# Patient Record
Sex: FEMALE | Race: WHITE | NOT HISPANIC OR LATINO | Employment: FULL TIME | RURAL
[De-identification: names, ages, dates, MRNs, and addresses within clinical notes are randomized per-mention and may not be internally consistent; named-entity substitution may affect disease eponyms.]

---

## 2021-07-28 ENCOUNTER — OFFICE VISIT (OUTPATIENT)
Dept: FAMILY MEDICINE CLINIC | Facility: CLINIC | Age: 41
End: 2021-07-28

## 2021-07-28 VITALS
OXYGEN SATURATION: 97 % | SYSTOLIC BLOOD PRESSURE: 124 MMHG | BODY MASS INDEX: 40.95 KG/M2 | DIASTOLIC BLOOD PRESSURE: 66 MMHG | WEIGHT: 245.8 LBS | HEART RATE: 85 BPM | HEIGHT: 65 IN | TEMPERATURE: 98.4 F | RESPIRATION RATE: 16 BRPM

## 2021-07-28 DIAGNOSIS — Z13.1 SCREENING FOR DIABETES MELLITUS: ICD-10-CM

## 2021-07-28 DIAGNOSIS — J30.89 NON-SEASONAL ALLERGIC RHINITIS, UNSPECIFIED TRIGGER: Primary | ICD-10-CM

## 2021-07-28 DIAGNOSIS — K21.9 GASTROESOPHAGEAL REFLUX DISEASE, UNSPECIFIED WHETHER ESOPHAGITIS PRESENT: ICD-10-CM

## 2021-07-28 DIAGNOSIS — E66.01 CLASS 3 SEVERE OBESITY DUE TO EXCESS CALORIES WITHOUT SERIOUS COMORBIDITY WITH BODY MASS INDEX (BMI) OF 40.0 TO 44.9 IN ADULT (HCC): ICD-10-CM

## 2021-07-28 DIAGNOSIS — F41.1 GENERALIZED ANXIETY DISORDER: ICD-10-CM

## 2021-07-28 DIAGNOSIS — K52.9 CHRONIC DIARRHEA: ICD-10-CM

## 2021-07-28 DIAGNOSIS — J30.89 NON-SEASONAL ALLERGIC RHINITIS, UNSPECIFIED TRIGGER: ICD-10-CM

## 2021-07-28 DIAGNOSIS — R53.82 CHRONIC FATIGUE: ICD-10-CM

## 2021-07-28 PROBLEM — R00.1 BRADYCARDIA: Status: ACTIVE | Noted: 2021-07-28

## 2021-07-28 PROBLEM — F41.9 ANXIETY: Status: ACTIVE | Noted: 2021-07-28

## 2021-07-28 PROBLEM — I48.0 PAROXYSMAL ATRIAL FIBRILLATION: Status: ACTIVE | Noted: 2021-07-28

## 2021-07-28 PROBLEM — E66.813 CLASS 3 SEVERE OBESITY DUE TO EXCESS CALORIES WITHOUT SERIOUS COMORBIDITY WITH BODY MASS INDEX (BMI) OF 40.0 TO 44.9 IN ADULT: Status: ACTIVE | Noted: 2021-07-28

## 2021-07-28 PROBLEM — I10 HYPERTENSION, BENIGN: Status: ACTIVE | Noted: 2021-07-28

## 2021-07-28 PROCEDURE — 99204 OFFICE O/P NEW MOD 45 MIN: CPT | Performed by: FAMILY MEDICINE

## 2021-07-28 RX ORDER — FEXOFENADINE HYDROCHLORIDE 60 MG/1
TABLET, FILM COATED ORAL
COMMUNITY
Start: 2020-09-14

## 2021-07-28 RX ORDER — BUSPIRONE HYDROCHLORIDE 7.5 MG/1
7.5 TABLET ORAL 2 TIMES DAILY
Qty: 60 TABLET | Refills: 5 | Status: SHIPPED | OUTPATIENT
Start: 2021-07-28 | End: 2021-08-18 | Stop reason: SDUPTHER

## 2021-07-28 RX ORDER — MONTELUKAST SODIUM 10 MG/1
10 TABLET ORAL NIGHTLY
Qty: 30 TABLET | Refills: 5 | Status: SHIPPED | OUTPATIENT
Start: 2021-07-28 | End: 2021-07-28

## 2021-07-28 RX ORDER — MONTELUKAST SODIUM 10 MG/1
10 TABLET ORAL NIGHTLY
Qty: 90 TABLET | Refills: 1 | Status: SHIPPED | OUTPATIENT
Start: 2021-07-28 | End: 2021-08-18 | Stop reason: SDUPTHER

## 2021-07-28 RX ORDER — LORATADINE 10 MG/1
CAPSULE, LIQUID FILLED ORAL
COMMUNITY
Start: 2021-02-15 | End: 2021-10-18

## 2021-07-28 NOTE — PROGRESS NOTES
Chief Complaint   Patient presents with   • Heartburn   • Anxiety   • Fatigue       Subjective   Didi Rivas is a 40 y.o. female.     Heartburn  She complains of belching and heartburn. She reports no chest pain or no choking. This is a recurrent problem. The current episode started more than 1 year ago (with birth of her son). The problem occurs frequently. The problem has been gradually worsening (worse over the past 6 months). Heartburn duration: varies. The heartburn is located in the substernum and left chest. Heartburn pain intensity: varies. The heartburn wakes her from sleep. The heartburn does not limit her activity. The heartburn changes with position. The symptoms are aggravated by bending, caffeine, certain foods and lying down (pizza, spaghetti, steak, eggs, even water - almost all foods). Associated symptoms include fatigue. Risk factors include caffeine use and lack of exercise. She has tried an antacid and a PPI for the symptoms. The treatment provided significant (no breakthrough symptoms on Nexium) relief.   Fatigue  This is a chronic problem. The current episode started more than 1 year ago. The problem occurs constantly. The problem has been gradually worsening. Associated symptoms include congestion and fatigue. Pertinent negatives include no chest pain, chills, fever, joint swelling, rash, swollen glands, vomiting or weakness. Nothing aggravates the symptoms. She has tried nothing for the symptoms.   Anxiety  Presents for initial visit. Episode onset: years. The problem has been gradually worsening. Symptoms include decreased concentration, depressed mood, excessive worry, insomnia, irritability, muscle tension, nervous/anxious behavior, obsessions, palpitations (palpitations in the past, better now), panic, restlessness and shortness of breath. Patient reports no chest pain, dizziness or suicidal ideas. Symptoms occur most days. Duration: varies. The severity of symptoms is causing  significant distress and interfering with daily activities (varies, ). The symptoms are aggravated by family issues, social activities, specific phobias and work stress. The patient sleeps 5 hours (to 6) per night. The quality of sleep is fair (to poor). Nighttime awakenings: occasional.     Risk factors include family history. Her past medical history is significant for anxiety/panic attacks. There is no history of suicide attempts. Treatments tried: lexapro. The treatment provided moderate relief. Compliance with prior treatments has been good.   Allergic Reaction  This is a chronic problem. The problem occurs constantly. The problem has been waxing and waning since onset. The problem is moderate. Associated with: seasonal allergens. Associated symptoms include diarrhea. Pertinent negatives include no chest pain, itching, rash, trouble swallowing or vomiting. There is no swelling present. Past treatments include one or more OTC medications (she alternates zyrtec and claritin). The treatment provided moderate relief. Her past medical history is significant for seasonal allergies.      Patient is returning to office with several concerns today.  Last visit in the office was 2016.  Interval medical care provided at the local urgent cares, mostly for sinus related symptoms and allergies.  She reports she had allergy testing in the past, but allergen reaction was not severe enough to warrant shots.  She was seen at [facility] for this.  She feels her allergies have worsened since last testing.      I have reviewed relevant past medical, family, social and surgical history for this patient.  Medications review is done by myself, with patient.      Past Medical History :  Active Ambulatory Problems     Diagnosis Date Noted   • Anxiety 07/28/2021   • Bradycardia 07/28/2021   • Hypertension, benign 07/28/2021   • Paroxysmal atrial fibrillation (CMS/HCC) 07/28/2021     Resolved Ambulatory Problems     Diagnosis Date Noted    • No Resolved Ambulatory Problems     Past Medical History:   Diagnosis Date   • Allergic    • Atrial fibrillation (CMS/HCC)    • GERD (gastroesophageal reflux disease) 2015       Medication List:    Current Outpatient Medications:   •  esomeprazole (nexIUM) 20 MG capsule, , Disp: , Rfl:   •  Etonogestrel (NEXPLANON) 68 MG implant subdermal implant, Inject  under the skin into the appropriate area as directed. Left arm, Disp: , Rfl:   •  fexofenadine (Allegra Allergy) 60 MG tablet, , Disp: , Rfl:   •  Loratadine (Claritin) 10 MG capsule, , Disp: , Rfl:       No Known Allergies    Social History     Tobacco Use   • Smoking status: Never Smoker   • Smokeless tobacco: Never Used   Substance Use Topics   • Alcohol use: Yes     Alcohol/week: 1.0 standard drinks     Types: 1 Glasses of wine per week     Comment: 1 drink weekly       PHQ-2 Depression Screening  Little interest or pleasure in doing things? 1   Feeling down, depressed, or hopeless? 0   PHQ-2 Total Score 1         Review of Systems   Constitutional: Positive for fatigue and irritability. Negative for chills and fever.   HENT: Positive for congestion and rhinorrhea. Negative for swollen glands and trouble swallowing.    Eyes: Negative for blurred vision, double vision and visual disturbance.   Respiratory: Positive for shortness of breath. Negative for apnea and choking.         + snoring   Cardiovascular: Positive for palpitations (palpitations in the past, better now). Negative for chest pain.   Gastrointestinal: Positive for constipation, diarrhea and GERD. Negative for vomiting.        Stools alternate between constipation and diarrhea   Endocrine: Negative for polydipsia and polyuria.   Genitourinary: Positive for amenorrhea (infrequent vaginal bleeding with Nexplanon). Negative for dysuria, menstrual problem and pelvic pain.   Musculoskeletal: Negative for joint swelling.   Skin: Negative for itching, pallor and rash.   Allergic/Immunologic: Positive  "for environmental allergies (h/o allergy testing, but did not require shots).   Neurological: Negative for dizziness, seizures, syncope and weakness.   Hematological: Negative for adenopathy.   Psychiatric/Behavioral: Positive for agitation, decreased concentration, sleep disturbance, depressed mood and stress. Negative for self-injury and suicidal ideas. The patient is nervous/anxious and has insomnia.         Anxiety           Objective   Vitals:    07/28/21 1021   BP: 124/66   BP Location: Right arm   Patient Position: Sitting   Cuff Size: Large Adult   Pulse: 85   Resp: 16   Temp: 98.4 °F (36.9 °C)   TempSrc: Skin   SpO2: 97%   Weight: 111 kg (245 lb 12.8 oz)   Height: 165.1 cm (65\")     Body mass index is 40.9 kg/m².    Physical Exam  Constitutional:       General: She is not in acute distress.     Appearance: Normal appearance. She is well-developed.   HENT:      Head: Normocephalic and atraumatic.      Right Ear: Tympanic membrane, ear canal and external ear normal.      Left Ear: Tympanic membrane, ear canal and external ear normal.   Eyes:      General: No scleral icterus.        Right eye: No discharge.         Left eye: No discharge.      Extraocular Movements: Extraocular movements intact.      Conjunctiva/sclera: Conjunctivae normal.   Neck:      Thyroid: No thyromegaly or thyroid tenderness.   Cardiovascular:      Rate and Rhythm: Normal rate and regular rhythm.      Heart sounds: Normal heart sounds. No murmur heard.     Pulmonary:      Effort: Pulmonary effort is normal.      Breath sounds: Normal breath sounds.   Abdominal:      Palpations: Abdomen is soft.   Musculoskeletal:         General: No swelling.      Cervical back: Normal range of motion and neck supple.      Right lower leg: No edema.      Left lower leg: No edema.   Skin:     General: Skin is warm.      Capillary Refill: Capillary refill takes less than 2 seconds.      Findings: No rash.   Neurological:      General: No focal deficit " present.      Mental Status: She is alert.      Cranial Nerves: No cranial nerve deficit.   Psychiatric:         Mood and Affect: Mood normal.         Behavior: Behavior normal.         Thought Content: Thought content normal.         Judgment: Judgment normal.           Assessment/Plan     Diagnoses and all orders for this visit:    1. Non-seasonal allergic rhinitis, unspecified trigger (Primary)  Comments:  Add montelukast.  Refer to Dr. Erazo.  Orders:  -     montelukast (SINGULAIR) 10 MG tablet; Take 1 tablet by mouth Every Night.  Dispense: 30 tablet; Refill: 5  -     Ambulatory Referral to Allergy    2. Gastroesophageal reflux disease, unspecified whether esophagitis present  Comments:  Continue OTC nexium, as this is helping.  Check labs and RUQ u/s.  Plan for referral if testing neg.  Orders:  -     US Gallbladder    3. Chronic diarrhea  Comments:  Various etiologies considered - GERD, gallstones, IBS (mixed).  Proceed with work-up.  Orders:  -     US Gallbladder    4. Generalized anxiety disorder  Comments:  Trial of buspirone.  Will obtain records from [facility] to see what medications were prescribed in the past.  Recommend f/u in 4 weeks.  Orders:  -     busPIRone (BUSPAR) 7.5 MG tablet; Take 1 tablet by mouth 2 (Two) Times a Day.  Dispense: 60 tablet; Refill: 5    5. Chronic fatigue  Comments:  She does snore.  Recommended she ask  about apnea.  Orders:  -     Comprehensive Metabolic Panel  -     TSH  -     CBC & Differential    6. Screening for diabetes mellitus  -     Hemoglobin A1c    7. Class 3 severe obesity due to excess calories without serious comorbidity with body mass index (BMI) of 40.0 to 44.9 in adult (CMS/[facility])        Medication and medication adverse effects discussed.  Drug education given and explained to patient. Patient verbalized understanding.  All questions presented by patient addressed.    Return in about 3 weeks (around 8/18/2021) for Annual.       Patient was given  instructions and counseling regarding his/her condition or for health maintenance advice. Please see specific information pulled into the AVS if appropriate.     I wore protective equipment throughout this patient encounter to include mask. Hand hygiene was performed before donning protective equipment and after removal when leaving the room.

## 2021-07-29 ENCOUNTER — TELEPHONE (OUTPATIENT)
Dept: FAMILY MEDICINE CLINIC | Facility: CLINIC | Age: 41
End: 2021-07-29

## 2021-07-29 LAB
ALBUMIN SERPL-MCNC: 4.4 G/DL (ref 3.8–4.8)
ALBUMIN/GLOB SERPL: 1.4 {RATIO} (ref 1.2–2.2)
ALP SERPL-CCNC: 102 IU/L (ref 48–121)
ALT SERPL-CCNC: 36 IU/L (ref 0–32)
AST SERPL-CCNC: 31 IU/L (ref 0–40)
BASOPHILS # BLD AUTO: 0 X10E3/UL (ref 0–0.2)
BASOPHILS NFR BLD AUTO: 0 %
BILIRUB SERPL-MCNC: 0.3 MG/DL (ref 0–1.2)
BUN SERPL-MCNC: 8 MG/DL (ref 6–20)
BUN/CREAT SERPL: 10 (ref 9–23)
CALCIUM SERPL-MCNC: 9.6 MG/DL (ref 8.7–10.2)
CHLORIDE SERPL-SCNC: 104 MMOL/L (ref 96–106)
CO2 SERPL-SCNC: 24 MMOL/L (ref 20–29)
CREAT SERPL-MCNC: 0.82 MG/DL (ref 0.57–1)
EOSINOPHIL # BLD AUTO: 0.2 X10E3/UL (ref 0–0.4)
EOSINOPHIL NFR BLD AUTO: 2 %
ERYTHROCYTE [DISTWIDTH] IN BLOOD BY AUTOMATED COUNT: 13.1 % (ref 11.7–15.4)
GLOBULIN SER CALC-MCNC: 3.2 G/DL (ref 1.5–4.5)
GLUCOSE SERPL-MCNC: 82 MG/DL (ref 65–99)
HBA1C MFR BLD: 5.6 % (ref 4.8–5.6)
HCT VFR BLD AUTO: 42 % (ref 34–46.6)
HGB BLD-MCNC: 13.3 G/DL (ref 11.1–15.9)
IMM GRANULOCYTES # BLD AUTO: 0 X10E3/UL (ref 0–0.1)
IMM GRANULOCYTES NFR BLD AUTO: 1 %
LYMPHOCYTES # BLD AUTO: 2.4 X10E3/UL (ref 0.7–3.1)
LYMPHOCYTES NFR BLD AUTO: 30 %
MCH RBC QN AUTO: 25.8 PG (ref 26.6–33)
MCHC RBC AUTO-ENTMCNC: 31.7 G/DL (ref 31.5–35.7)
MCV RBC AUTO: 82 FL (ref 79–97)
MONOCYTES # BLD AUTO: 0.6 X10E3/UL (ref 0.1–0.9)
MONOCYTES NFR BLD AUTO: 7 %
NEUTROPHILS # BLD AUTO: 4.9 X10E3/UL (ref 1.4–7)
NEUTROPHILS NFR BLD AUTO: 60 %
PLATELET # BLD AUTO: 296 X10E3/UL (ref 150–450)
POTASSIUM SERPL-SCNC: 4.7 MMOL/L (ref 3.5–5.2)
PROT SERPL-MCNC: 7.6 G/DL (ref 6–8.5)
RBC # BLD AUTO: 5.15 X10E6/UL (ref 3.77–5.28)
SODIUM SERPL-SCNC: 142 MMOL/L (ref 134–144)
TSH SERPL DL<=0.005 MIU/L-ACNC: 1.14 UIU/ML (ref 0.45–4.5)
WBC # BLD AUTO: 8.1 X10E3/UL (ref 3.4–10.8)

## 2021-07-29 NOTE — TELEPHONE ENCOUNTER
Spoke to pt 07/29/2021, 10:14am advised pt of appt with Dr. Erazo 08/06/2021, 08:30am also advised not to take Claritin 5 days prior to appt.

## 2021-08-06 ENCOUNTER — HOSPITAL ENCOUNTER (OUTPATIENT)
Dept: ULTRASOUND IMAGING | Facility: HOSPITAL | Age: 41
Discharge: HOME | End: 2021-08-06
Admitting: FAMILY MEDICINE

## 2021-08-06 PROCEDURE — 76705 ECHO EXAM OF ABDOMEN: CPT

## 2021-08-18 ENCOUNTER — OFFICE VISIT (OUTPATIENT)
Dept: FAMILY MEDICINE CLINIC | Facility: CLINIC | Age: 41
End: 2021-08-18

## 2021-08-18 VITALS
DIASTOLIC BLOOD PRESSURE: 79 MMHG | SYSTOLIC BLOOD PRESSURE: 128 MMHG | OXYGEN SATURATION: 97 % | RESPIRATION RATE: 18 BRPM | WEIGHT: 247.2 LBS | HEIGHT: 64 IN | BODY MASS INDEX: 42.2 KG/M2 | HEART RATE: 117 BPM | TEMPERATURE: 98 F

## 2021-08-18 DIAGNOSIS — Z00.00 ANNUAL PHYSICAL EXAM: Primary | ICD-10-CM

## 2021-08-18 DIAGNOSIS — E66.01 CLASS 3 SEVERE OBESITY DUE TO EXCESS CALORIES WITHOUT SERIOUS COMORBIDITY WITH BODY MASS INDEX (BMI) OF 40.0 TO 44.9 IN ADULT (HCC): ICD-10-CM

## 2021-08-18 DIAGNOSIS — F41.1 GENERALIZED ANXIETY DISORDER: ICD-10-CM

## 2021-08-18 DIAGNOSIS — J30.89 NON-SEASONAL ALLERGIC RHINITIS, UNSPECIFIED TRIGGER: ICD-10-CM

## 2021-08-18 DIAGNOSIS — R53.82 CHRONIC FATIGUE: ICD-10-CM

## 2021-08-18 DIAGNOSIS — R06.83 SNORING: ICD-10-CM

## 2021-08-18 DIAGNOSIS — K21.9 GASTROESOPHAGEAL REFLUX DISEASE, UNSPECIFIED WHETHER ESOPHAGITIS PRESENT: ICD-10-CM

## 2021-08-18 PROCEDURE — 99395 PREV VISIT EST AGE 18-39: CPT | Performed by: FAMILY MEDICINE

## 2021-08-18 RX ORDER — BUSPIRONE HYDROCHLORIDE 7.5 MG/1
7.5 TABLET ORAL 2 TIMES DAILY
Qty: 180 TABLET | Refills: 1 | Status: SHIPPED | OUTPATIENT
Start: 2021-08-18 | End: 2021-09-17 | Stop reason: SDUPTHER

## 2021-08-18 RX ORDER — MONTELUKAST SODIUM 10 MG/1
10 TABLET ORAL NIGHTLY
Qty: 90 TABLET | Refills: 3 | Status: SHIPPED | OUTPATIENT
Start: 2021-08-18 | End: 2022-06-24

## 2021-09-05 PROBLEM — R00.2 PALPITATION: Status: ACTIVE | Noted: 2021-09-05

## 2021-09-05 PROBLEM — R00.1 BRADYCARDIA: Status: RESOLVED | Noted: 2021-07-28 | Resolved: 2021-09-05

## 2021-09-05 PROBLEM — I49.5 TACHYCARDIA-BRADYCARDIA: Status: ACTIVE | Noted: 2021-09-05

## 2021-09-17 DIAGNOSIS — F41.1 GENERALIZED ANXIETY DISORDER: ICD-10-CM

## 2021-09-17 RX ORDER — BUSPIRONE HYDROCHLORIDE 15 MG/1
15 TABLET ORAL 2 TIMES DAILY
Qty: 180 TABLET | Refills: 1 | Status: SHIPPED | OUTPATIENT
Start: 2021-09-17 | End: 2021-10-18

## 2021-09-30 ENCOUNTER — TELEPHONE (OUTPATIENT)
Dept: FAMILY MEDICINE CLINIC | Facility: CLINIC | Age: 41
End: 2021-09-30

## 2021-09-30 NOTE — TELEPHONE ENCOUNTER
PATIENT STATES: THAT SHE TESTED POSITIVE FOR COVID AND WOULD LIKE TO KNOW WHAT SHE CAN TAKE TO HELP HER PLEASE ADVISE      PATIENT CAN BE REACHED ON: [phone]    PHARMACY [id number] - [address] [facility] [address] [facility] [address] - [phone]  - [phone]   [phone]

## 2021-10-12 ENCOUNTER — TELEPHONE (OUTPATIENT)
Dept: FAMILY MEDICINE CLINIC | Facility: CLINIC | Age: 41
End: 2021-10-12

## 2021-10-12 NOTE — TELEPHONE ENCOUNTER
Caller: Didi Rivas    Relationship to patient: Self    Best call back number:[phone]    Chief complaint: SWEATING, SHAKY, FATIGUED    Type of visit: OFFICE VISIT    Requested date: Wednesday/THURSDAY OR FRIDAY    If rescheduling, when is the original appointment:     Additional notes:PATIENT SAID SHE NOTICE YESTERDAY SHE IS GETTING FATIGUED QUICKLY, GETTING SHAKY, AND SWEATING PROFUSELY. SHE SAID SHE GETS WINDED EASILY AND COUGHING ARE FROM COVID, BUT SHE SAID SHE DOESN'T FEEL LIKE SHE IS GETTING BETTER. SHE WOULD LIKE TO TALK TO HER DOCTOR BUT DOES NOT FEEL COMFORTABLE WAITING FOR AN APPOINTMENT NEXT WEEK. PLEASE CALL PATIENT AND ADVISE

## 2021-10-13 NOTE — TELEPHONE ENCOUNTER
Patient states that Friday ended her 10 days of being home for covid that she did test negative patient states that since then she has been feeling shaky and her heart has been racing. And all together just not feeling better.     Appointment made.

## 2021-10-18 ENCOUNTER — OFFICE VISIT (OUTPATIENT)
Dept: FAMILY MEDICINE CLINIC | Facility: CLINIC | Age: 41
End: 2021-10-18

## 2021-10-18 VITALS
HEIGHT: 65 IN | HEART RATE: 80 BPM | WEIGHT: 243.5 LBS | SYSTOLIC BLOOD PRESSURE: 130 MMHG | DIASTOLIC BLOOD PRESSURE: 76 MMHG | RESPIRATION RATE: 18 BRPM | BODY MASS INDEX: 40.57 KG/M2 | TEMPERATURE: 97.8 F | OXYGEN SATURATION: 96 %

## 2021-10-18 DIAGNOSIS — E66.01 CLASS 3 SEVERE OBESITY DUE TO EXCESS CALORIES WITHOUT SERIOUS COMORBIDITY WITH BODY MASS INDEX (BMI) OF 40.0 TO 44.9 IN ADULT (HCC): ICD-10-CM

## 2021-10-18 DIAGNOSIS — F41.1 GENERALIZED ANXIETY DISORDER: ICD-10-CM

## 2021-10-18 DIAGNOSIS — U07.1 COVID-19 VIRUS INFECTION: Primary | ICD-10-CM

## 2021-10-18 PROCEDURE — 99214 OFFICE O/P EST MOD 30 MIN: CPT | Performed by: FAMILY MEDICINE

## 2021-10-18 RX ORDER — AMOXICILLIN AND CLAVULANATE POTASSIUM 875; 125 MG/1; MG/1
TABLET, FILM COATED ORAL
COMMUNITY
Start: 2021-10-15

## 2021-10-18 RX ORDER — EPINEPHRINE 0.3 MG/.3ML
INJECTION, SOLUTION INTRAMUSCULAR
COMMUNITY
Start: 2021-09-14

## 2021-10-18 RX ORDER — ALBUTEROL SULFATE 90 UG/1
AEROSOL, METERED RESPIRATORY (INHALATION)
COMMUNITY
Start: 2021-10-15

## 2021-10-18 RX ORDER — ESCITALOPRAM OXALATE 10 MG/1
10 TABLET ORAL DAILY
Qty: 30 TABLET | Refills: 5 | Status: SHIPPED | OUTPATIENT
Start: 2021-10-18

## 2022-01-18 DIAGNOSIS — F41.1 GENERALIZED ANXIETY DISORDER: ICD-10-CM

## 2022-01-19 RX ORDER — BUSPIRONE HYDROCHLORIDE 7.5 MG/1
TABLET ORAL
Qty: 180 TABLET | Refills: 1 | OUTPATIENT
Start: 2022-01-19

## 2022-06-06 ENCOUNTER — HOSPITAL ENCOUNTER (OUTPATIENT)
Dept: MAMMOGRAPHY | Facility: HOSPITAL | Age: 42
Discharge: HOME | End: 2022-06-06
Admitting: OBSTETRICS & GYNECOLOGY

## 2022-06-06 DIAGNOSIS — Z12.31 SCREENING MAMMOGRAM, ENCOUNTER FOR: ICD-10-CM

## 2022-06-06 PROCEDURE — 77063 BREAST TOMOSYNTHESIS BI: CPT

## 2022-06-06 PROCEDURE — 77067 SCR MAMMO BI INCL CAD: CPT

## 2022-06-16 ENCOUNTER — HOSPITAL ENCOUNTER (OUTPATIENT)
Dept: ULTRASOUND IMAGING | Facility: HOSPITAL | Age: 42
Discharge: HOME | End: 2022-06-16

## 2022-06-16 ENCOUNTER — HOSPITAL ENCOUNTER (OUTPATIENT)
Dept: MAMMOGRAPHY | Facility: HOSPITAL | Age: 42
Discharge: HOME | End: 2022-06-16

## 2022-06-16 DIAGNOSIS — R92.8 ABNORMALITY OF LEFT BREAST ON SCREENING MAMMOGRAM: ICD-10-CM

## 2022-06-16 PROCEDURE — G0279 TOMOSYNTHESIS, MAMMO: HCPCS

## 2022-06-16 PROCEDURE — 76642 ULTRASOUND BREAST LIMITED: CPT

## 2022-06-16 PROCEDURE — 77065 DX MAMMO INCL CAD UNI: CPT

## 2022-06-23 DIAGNOSIS — J30.89 NON-SEASONAL ALLERGIC RHINITIS, UNSPECIFIED TRIGGER: ICD-10-CM

## 2022-06-24 RX ORDER — MONTELUKAST SODIUM 10 MG/1
TABLET ORAL
Qty: 90 TABLET | Refills: 1 | Status: SHIPPED | OUTPATIENT
Start: 2022-06-24

## 2022-12-01 ENCOUNTER — TRANSCRIBE ORDERS (OUTPATIENT)
Dept: ADMINISTRATIVE | Facility: HOSPITAL | Age: 42
End: 2022-12-01

## 2022-12-01 DIAGNOSIS — R92.8 ABNORMAL MAMMOGRAM: Primary | ICD-10-CM

## 2022-12-12 ENCOUNTER — HOSPITAL ENCOUNTER (OUTPATIENT)
Dept: ULTRASOUND IMAGING | Facility: HOSPITAL | Age: 42
Discharge: HOME | End: 2022-12-12

## 2022-12-12 ENCOUNTER — HOSPITAL ENCOUNTER (OUTPATIENT)
Dept: MAMMOGRAPHY | Facility: HOSPITAL | Age: 42
Discharge: HOME | End: 2022-12-12

## 2022-12-12 DIAGNOSIS — R92.8 ABNORMALITY OF LEFT BREAST ON SCREENING MAMMOGRAM: ICD-10-CM

## 2022-12-12 DIAGNOSIS — R92.8 ABNORMAL MAMMOGRAM: ICD-10-CM

## 2022-12-12 PROCEDURE — 76642 ULTRASOUND BREAST LIMITED: CPT

## 2023-05-30 ENCOUNTER — TRANSCRIBE ORDERS (OUTPATIENT)
Dept: ADMINISTRATIVE | Facility: HOSPITAL | Age: 43
End: 2023-05-30

## 2023-05-30 DIAGNOSIS — R92.8 ABNORMAL FINDING ON BREAST IMAGING: Primary | ICD-10-CM

## 2023-06-02 ENCOUNTER — DOCUMENTATION (OUTPATIENT)
Dept: MAMMOGRAPHY | Facility: HOSPITAL | Age: 43
End: 2023-06-02
Payer: COMMERCIAL

## 2023-06-09 ENCOUNTER — HOSPITAL ENCOUNTER (OUTPATIENT)
Dept: MAMMOGRAPHY | Facility: HOSPITAL | Age: 43
Discharge: HOME | End: 2023-06-09
Payer: COMMERCIAL

## 2023-06-16 ENCOUNTER — HOSPITAL ENCOUNTER (OUTPATIENT)
Dept: MAMMOGRAPHY | Facility: HOSPITAL | Age: 43
Discharge: HOME | End: 2023-06-16
Payer: COMMERCIAL

## 2023-06-16 ENCOUNTER — HOSPITAL ENCOUNTER (OUTPATIENT)
Dept: ULTRASOUND IMAGING | Facility: HOSPITAL | Age: 43
Discharge: HOME | End: 2023-06-16
Payer: COMMERCIAL

## 2023-06-16 DIAGNOSIS — R92.8 ABNORMAL FINDING ON BREAST IMAGING: ICD-10-CM

## 2023-06-16 DIAGNOSIS — R92.8 ABNORMALITY OF LEFT BREAST ON SCREENING MAMMOGRAM: ICD-10-CM

## 2023-06-16 PROCEDURE — G0279 TOMOSYNTHESIS, MAMMO: HCPCS

## 2023-06-16 PROCEDURE — 76642 ULTRASOUND BREAST LIMITED: CPT

## 2023-06-16 PROCEDURE — 77066 DX MAMMO INCL CAD BI: CPT

## 2024-01-30 ENCOUNTER — OFFICE VISIT (OUTPATIENT)
Dept: FAMILY MEDICINE CLINIC | Facility: CLINIC | Age: 44
End: 2024-01-30
Payer: COMMERCIAL

## 2024-01-30 VITALS
HEART RATE: 87 BPM | OXYGEN SATURATION: 98 % | DIASTOLIC BLOOD PRESSURE: 70 MMHG | HEIGHT: 66 IN | TEMPERATURE: 97.7 F | WEIGHT: 256 LBS | SYSTOLIC BLOOD PRESSURE: 130 MMHG | BODY MASS INDEX: 41.14 KG/M2 | RESPIRATION RATE: 16 BRPM

## 2024-01-30 DIAGNOSIS — F32.A ANXIETY AND DEPRESSION: ICD-10-CM

## 2024-01-30 DIAGNOSIS — F41.9 ANXIETY AND DEPRESSION: ICD-10-CM

## 2024-01-30 DIAGNOSIS — R73.09 ELEVATED GLUCOSE: ICD-10-CM

## 2024-01-30 DIAGNOSIS — L98.9 SKIN LESION: ICD-10-CM

## 2024-01-30 DIAGNOSIS — R89.9 ABNORMAL LABORATORY TEST RESULT: ICD-10-CM

## 2024-01-30 DIAGNOSIS — R00.2 PALPITATIONS: Primary | ICD-10-CM

## 2024-01-30 DIAGNOSIS — R53.83 OTHER FATIGUE: ICD-10-CM

## 2024-01-30 DIAGNOSIS — Z23 NEED FOR INFLUENZA VACCINATION: ICD-10-CM

## 2024-01-30 DIAGNOSIS — E55.9 VITAMIN D DEFICIENCY: ICD-10-CM

## 2024-01-30 DIAGNOSIS — E78.00 ELEVATED CHOLESTEROL: ICD-10-CM

## 2024-01-30 RX ORDER — SERTRALINE HYDROCHLORIDE 25 MG/1
TABLET, FILM COATED ORAL
Qty: 30 TABLET | Refills: 1 | Status: SHIPPED | OUTPATIENT
Start: 2024-01-30

## 2024-01-30 NOTE — PATIENT INSTRUCTIONS
Introduction to Sleep Deficiency and Sleep Hygiene    What is sleep insufficiency?    This is the term doctors use when a person does not get enough restful sleep.  Sleep insufficiency is different from insomnia, which is when a person has trouble falling or staying asleep. In both cases, the person might sleep less than they should, and have trouble staying alert during the day. But in general, people with sleep insufficiency would be able to sleep if they had the chance. Usually, there are things outside their control keeping them from getting restful sleep.    Why is sleep important?     You need sleep in order to feel awake during the day, to be alert enough to do your normal activities, and to keep your body healthy. If you do not get enough sleep, or do not get restful sleep, it can lead to problems.    Lots of different things can get in the way of sleeping. For example, you might work long hours, care for family members, or have health problems that make it hard to get enough sleep.     How much sleep do I need?     It is different for every person. Most adults need about 7 to 9 hours of sleep each night in order to feel awake enough during the day. But some people feel rested after a shorter sleep, and others need more sleep to feel alert the next day.    What are the symptoms of not getting enough sleep?     If you are not getting enough sleep, you might:    ?Have trouble staying awake to do your normal activities  ?Have trouble thinking clearly or focusing  ?Feel sad, anxious, or irritable  ?Fall asleep at inappropriate times, such as at work or while driving    Can not getting enough sleep lead to problems?     Yes. If you do not get enough sleep, or if you do not feel rested after sleeping, this can lead to problems like:    ?Accidents - If you fall asleep while driving, you could be seriously hurt or killed. You could also accidentally hurt or kill another person. Accidents can also happen if you  "are too tired or fall asleep while you are caring for a baby or child.  ?Work problems - If you are too tired at work, you can make mistakes. As a result, you could get into trouble or lose your job. Depending on the work you do, it could also be dangerous for you or for others.  ?Health problems - Not getting enough restful sleep over time can affect your health. Your immune system might have trouble doing its job to protect your body from infections. You might also be at higher risk for obesity and heart disease.  ?Stress - If you feel tired all the time, you might stop doing activities you used to enjoy, like spending time with friends or your partner. It can also be stressful and embarrassing to fall asleep at inappropriate times.    Are there treatments that can help?    The main treatment is to improve your habits to try to get more and better sleep. Doctors call this following good \"sleep hygiene.\" That means that you:     ?Go to bed and get up at the same time every day  ?Have coffee, tea, and other foods that have caffeine only in the morning  ?Avoid alcohol in the late afternoon, evening, and bedtime  ?Avoid smoking, especially in the evening  ?Keep your bedroom dark, cool, quiet, and free of reminders of work or other things that cause you stress  ?Try to solve problems you have before you go to bed  ?Exercise several days a week, but not right before bed  ?Avoid looking at phones or reading devices (\"e-books\") that give off light before bed. This can make it harder to fall asleep.  ?Avoid long naps if you have trouble sleeping at night, especially in the late afternoon. Short naps (about 20 minutes) can be helpful, especially if your work schedule changes day to day and you need to be alert at different times.    Other things that can improve sleep include:  ?Relaxation therapy, in which you focus on relaxing all the muscles in your body 1 at a time  ?Working with a counselor or psychologist to deal with " the problems that might be causing you to not get enough sleep    Information was taken from [email or web address]

## 2024-01-30 NOTE — PROGRESS NOTES
Subjective   Didi Rivas is a 43 y.o. female.   Chief Complaint   Patient presents with    Establish Care     Pt transferring from Dr. Amanda Stanton    Palpitations    Skin abnormality    Fatigue       History of Present Illness         Palpitations:  -Started:  1 episode last week, waxing and waning all day. Heart rate would speed up and slow down  -Symptoms:  pt denies any pain, light headed, cold sweat  -Patient states she gets an episode about 1 time a month  -Per chart review it looks like she was started on propranolol 10 mg on 12/21/2015  -Treatment:  Drank water, relaxed    Anxiety and Depression Screening  - Today   PHQ-9: 12   MELISA-7: 10  - states has been on several medications but made her tired  - does a CBD drop under her tongue, helps sometimes  - does have a therapist through work (sees her every other week)  - works at home. Has a very busy life with a lot of stressors  - past medications: Buspar, lexapro, citalopram, wellbutrin     Skin abnormality:  -Left lower extremity, right thigh  -Red, dry, no increase in size, raised above the skin    Skin lesion on left ankle  -Patient states that she had a red, dry flat skin lesion that appeared on her left ankle a couple years ago.  It is never grown changed or caused her any problems and her prior provider felt like it was safe but she wanted to have me have a look at it      Fatigue:  -Started:  years ago  -Occurs daily, tired,  pt does not sleep well (6 hours average)        The following portions of the patient's history were reviewed and updated as appropriate: allergies, current medications, past family history, past medical history, past social history, past surgical history, and problem list.    Patient Active Problem List   Diagnosis    Anxiety    Hypertension, benign    Paroxysmal atrial fibrillation    Class 3 severe obesity due to excess calories without serious comorbidity with body mass index (BMI) of 40.0 to 44.9 in adult     "Chronic fatigue    Generalized anxiety disorder    Chronic diarrhea    Gastroesophageal reflux disease    Non-seasonal allergic rhinitis    Tachycardia-bradycardia    Palpitation       Current Outpatient Medications on File Prior to Visit   Medication Sig Dispense Refill    albuterol sulfate  (90 Base) MCG/ACT inhaler       Auvi-Q 0.3 MG/0.3ML solution auto-injector injection INJECT AS NEEDED FOR SEVERE ALLERGIC REACTION INCLUDING ANAPHYLAXIS AS DIRECTED      esomeprazole (nexIUM) 20 MG capsule       Etonogestrel (NEXPLANON) 68 MG implant subdermal implant Inject  under the skin into the appropriate area as directed. Left arm      fexofenadine (Allegra Allergy) 60 MG tablet        No current facility-administered medications on file prior to visit.     Current outpatient and discharge medications have been reconciled for the patient.  Reviewed by: Linda Pritchett DO      No Known Allergies      Objective   Visit Vitals  /70 (BP Location: Left arm, Patient Position: Sitting, Cuff Size: Large Adult)   Pulse 87   Temp 97.7 °F (36.5 °C) (Skin)   Resp 16   Ht 167.6 cm (66\")   Wt 116 kg (256 lb)   SpO2 98%   BMI 41.32 kg/m²       Physical Exam  HENT:      Head: Normocephalic and atraumatic.   Eyes:      Conjunctiva/sclera: Conjunctivae normal.   Skin:     Comments: - Left ankle: Small about 1 cm long slightly erythematous flat dry patch of skin.  Nontender to palpation, no swelling, no papules or crusting noted   Neurological:      General: No focal deficit present.      Mental Status: She is alert and oriented to person, place, and time.   Psychiatric:         Mood and Affect: Mood normal.         Behavior: Behavior normal.           ECG 12 Lead    Date/Time: 2/1/2024 12:41 PM  Performed by: Linda Pritchett DO    Authorized by: Linda Pritchett DO  Comparison: not compared with previous ECG   Previous ECG: no previous ECG available  Rhythm: sinus rhythm  Rate: normal  Conduction: conduction normal  ST " "Segments: ST segments normal  T Waves: T waves normal  QRS axis: normal  Other: no other findings    Clinical impression: normal ECG            Diagnoses and all orders for this visit:    1. Palpitations (Primary)  -     ECG 12 Lead: NSR  -     Cardiac event monitor; Future  -     Comprehensive metabolic panel  -     CBC & Differential  -     TSH Rfx On Abnormal To Free T4  -Discussed with patient that I would not be surprised if her anxiety and depression might be a cause (as in the palpitations may improve if the anxiety and depression are well addressed), however reassured patient that we want to make sure that her heart is completely safe.  Patient verbalized understanding    2. Anxiety and depression  -Discussed with patient that since she is had side effects or no improvement with multiple medications, it would be a good time to go ahead and get established with a specialist (psychiatry) to work with her.  Patient verbalized understanding and is agreeable  -  Patient had not tried sertraline yet, started sertraline (Zoloft) 25 MG tablet; 1/2 tablet daily for the first 7 days the increase to full tablet daily  Dispense: 30 tablet; Refill: 1.  Patient was agreeable to start  -     Ambulatory Referral to Psychiatry: [facility] [place]  Discussed SSRIs, side effects, that it takes 4 to 6 weeks to see max benefit of the medication, but side effects typically will improve over time as the body gets adjusted.  If the side effects are \"annoying\" to give the body some time to get adjusted but if they are life impacting to call the office to switch medication before we see the pt next.  Patient verbalized understanding      3. Other fatigue  -     Hemoglobin A1c  -     Iron Profile  -     Ferritin  -     Vitamin B12    Skin lesion  -Reassured patient as well that I think the skin lesions look safe. Told her though that if anything changes she should come back and we can evaluate it again.  Patient " verbalized understanding    4. Abnormal laboratory test result  -     Comprehensive metabolic panel  -     CBC & Differential    5. Elevated cholesterol  -     Lipid panel    6. Elevated glucose  -     Hemoglobin A1c    7. Vitamin D deficiency  -     Vitamin D,25-Hydroxy    8. Need for influenza vaccination  -     Fluzone (or Fluarix & Flulaval for VFC) >6mos             Follow Up  -4 to 6 weeks for anxiety and depression as well as annual physical    Expected course, medications, and adverse effects discussed as appropriate.  Call or return if worsening or persistent symptoms.  I wore protective equipment throughout this patient encounter to include mask and eye protection. Hand hygiene was performed before donning protective equipment and after removal when leaving the room.    This document is intended for medical expert use only. Reading of this document by patients and/or patient's family without participating medical staff guidance may result in misinterpretation and unintended morbidity. Any interpretation of such data is the responsibility of the patient and/or family member responsible for the patient in concert with their primary or specialist providers, not to be left for sources of online searches such as [employer], [employer] or similar queries. Relying on these approaches to knowledge may result in misinterpretation, misguided goals of care and even death should patients or family members try recommendations outside of the realm of professional medical care.

## 2024-02-01 ENCOUNTER — PATIENT ROUNDING (BHMG ONLY) (OUTPATIENT)
Dept: FAMILY MEDICINE CLINIC | Facility: CLINIC | Age: 44
End: 2024-02-01
Payer: COMMERCIAL

## 2024-02-01 NOTE — PROGRESS NOTES
February 1, 2024    Hello, may I speak with Didi Rivas?    My name is Shala Rios     I am  with [facility] [address]  [facility]  [address] [place] [address] [address]  [place] [address].    Before we get started may I verify your date of birth? 1980    I am calling to officially welcome you to our practice and ask about your recent visit. Is this a good time to talk? yes    Tell me about your visit with us. What things went well?  great visit       We're always looking for ways to make our patients' experiences even better. Do you have recommendations on ways we may improve?  no    Overall were you satisfied with your first visit to our practice? yes       I appreciate you taking the time to speak with me today. Is there anything else I can do for you? no      Thank you, and have a great day.       Has refills on file for 60 day supply.

## 2024-02-03 LAB
25(OH)D3+25(OH)D2 SERPL-MCNC: 36.8 NG/ML (ref 30–100)
ALBUMIN SERPL-MCNC: 4.2 G/DL (ref 3.9–4.9)
ALBUMIN/GLOB SERPL: 1.6 {RATIO} (ref 1.2–2.2)
ALP SERPL-CCNC: 93 IU/L (ref 44–121)
ALT SERPL-CCNC: 24 IU/L (ref 0–32)
AST SERPL-CCNC: 22 IU/L (ref 0–40)
BASOPHILS # BLD AUTO: 0 X10E3/UL (ref 0–0.2)
BASOPHILS NFR BLD AUTO: 0 %
BILIRUB SERPL-MCNC: 0.3 MG/DL (ref 0–1.2)
BUN SERPL-MCNC: 11 MG/DL (ref 6–24)
BUN/CREAT SERPL: 14 (ref 9–23)
CALCIUM SERPL-MCNC: 9.3 MG/DL (ref 8.7–10.2)
CHLORIDE SERPL-SCNC: 104 MMOL/L (ref 96–106)
CHOLEST SERPL-MCNC: 208 MG/DL (ref 100–199)
CO2 SERPL-SCNC: 24 MMOL/L (ref 20–29)
CREAT SERPL-MCNC: 0.77 MG/DL (ref 0.57–1)
EGFRCR SERPLBLD CKD-EPI 2021: 99 ML/MIN/1.73
EOSINOPHIL # BLD AUTO: 0.2 X10E3/UL (ref 0–0.4)
EOSINOPHIL NFR BLD AUTO: 2 %
ERYTHROCYTE [DISTWIDTH] IN BLOOD BY AUTOMATED COUNT: 13.2 % (ref 11.7–15.4)
FERRITIN SERPL-MCNC: 42 NG/ML (ref 15–150)
GLOBULIN SER CALC-MCNC: 2.6 G/DL (ref 1.5–4.5)
GLUCOSE SERPL-MCNC: 87 MG/DL (ref 70–99)
HBA1C MFR BLD: 5.8 % (ref 4.8–5.6)
HCT VFR BLD AUTO: 39.9 % (ref 34–46.6)
HDLC SERPL-MCNC: 48 MG/DL
HGB BLD-MCNC: 12.8 G/DL (ref 11.1–15.9)
IMM GRANULOCYTES # BLD AUTO: 0 X10E3/UL (ref 0–0.1)
IMM GRANULOCYTES NFR BLD AUTO: 1 %
IRON SATN MFR SERPL: 14 % (ref 15–55)
IRON SERPL-MCNC: 44 UG/DL (ref 27–159)
LDLC SERPL CALC-MCNC: 143 MG/DL (ref 0–99)
LYMPHOCYTES # BLD AUTO: 2.1 X10E3/UL (ref 0.7–3.1)
LYMPHOCYTES NFR BLD AUTO: 24 %
MCH RBC QN AUTO: 25.9 PG (ref 26.6–33)
MCHC RBC AUTO-ENTMCNC: 32.1 G/DL (ref 31.5–35.7)
MCV RBC AUTO: 81 FL (ref 79–97)
MONOCYTES # BLD AUTO: 0.6 X10E3/UL (ref 0.1–0.9)
MONOCYTES NFR BLD AUTO: 8 %
NEUTROPHILS # BLD AUTO: 5.5 X10E3/UL (ref 1.4–7)
NEUTROPHILS NFR BLD AUTO: 65 %
PLATELET # BLD AUTO: 275 X10E3/UL (ref 150–450)
POTASSIUM SERPL-SCNC: 4.7 MMOL/L (ref 3.5–5.2)
PROT SERPL-MCNC: 6.8 G/DL (ref 6–8.5)
RBC # BLD AUTO: 4.94 X10E6/UL (ref 3.77–5.28)
SODIUM SERPL-SCNC: 140 MMOL/L (ref 134–144)
TIBC SERPL-MCNC: 325 UG/DL (ref 250–450)
TRIGL SERPL-MCNC: 94 MG/DL (ref 0–149)
TSH SERPL DL<=0.005 MIU/L-ACNC: 1.23 UIU/ML (ref 0.45–4.5)
UIBC SERPL-MCNC: 281 UG/DL (ref 131–425)
VIT B12 SERPL-MCNC: 379 PG/ML (ref 232–1245)
VLDLC SERPL CALC-MCNC: 17 MG/DL (ref 5–40)
WBC # BLD AUTO: 8.5 X10E3/UL (ref 3.4–10.8)

## 2024-02-05 PROBLEM — E78.2 MIXED HYPERLIPIDEMIA: Status: ACTIVE | Noted: 2024-02-05

## 2024-02-05 PROBLEM — R73.03 PREDIABETES: Status: ACTIVE | Noted: 2024-02-05

## 2024-02-07 ENCOUNTER — TELEMEDICINE (OUTPATIENT)
Dept: FAMILY MEDICINE CLINIC | Facility: TELEHEALTH | Age: 44
End: 2024-02-07
Payer: COMMERCIAL

## 2024-02-07 DIAGNOSIS — U07.1 COVID-19: Primary | ICD-10-CM

## 2024-02-07 PROBLEM — J30.2 SEASONAL ALLERGIES: Status: ACTIVE | Noted: 2023-11-04

## 2024-02-07 PROBLEM — O12.10 PROTEINURIA AFFECTING PREGNANCY: Status: ACTIVE | Noted: 2023-11-04

## 2024-02-07 PROBLEM — O41.00X0 OLIGOHYDRAMNIOS: Status: ACTIVE | Noted: 2023-11-04

## 2024-02-07 PROBLEM — O36.0990 RHESUS ISOIMMUNIZATION AFFECTING PREGNANCY: Status: ACTIVE | Noted: 2023-11-04

## 2024-02-07 RX ORDER — DEXTROMETHORPHAN HYDROBROMIDE AND PROMETHAZINE HYDROCHLORIDE 15; 6.25 MG/5ML; MG/5ML
5 SYRUP ORAL 4 TIMES DAILY PRN
Qty: 118 ML | Refills: 0 | Status: SHIPPED | OUTPATIENT
Start: 2024-02-07

## 2024-02-07 RX ORDER — CHLORCYCLIZINE HYDROCHLORIDE AND PSEUDOEPHEDRINE HYDROCHLORIDE 25; 60 MG/1; MG/1
1 TABLET ORAL 3 TIMES DAILY PRN
Qty: 15 TABLET | Refills: 0 | Status: SHIPPED | OUTPATIENT
Start: 2024-02-07
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
## 2024-02-07 NOTE — PATIENT INSTRUCTIONS
Continue treating symptoms.  Alternate tylenol and motrin for pain and/or fever, stay hydrated and rest.     Warning signs for COVID-19: trouble breathing, increasing shortness of breath, persistent chest pain or pressure, new confusion, inability to stay awake, pale, gray or blue-colored skin, lips or nail beds. If you show any of these signs seek Emergency Care.       If symptoms worsen or do not improve follow up with your PCP or visit your nearest Urgent Care Center or ER.

## 2024-02-14 ENCOUNTER — TELEPHONE (OUTPATIENT)
Dept: FAMILY MEDICINE CLINIC | Facility: CLINIC | Age: 44
End: 2024-02-14
Payer: COMMERCIAL

## 2024-02-16 ENCOUNTER — HOSPITAL ENCOUNTER (OUTPATIENT)
Dept: RESPIRATORY THERAPY | Facility: HOSPITAL | Age: 44
Discharge: HOME | End: 2024-02-16
Payer: COMMERCIAL

## 2024-02-16 DIAGNOSIS — R00.2 PALPITATIONS: ICD-10-CM

## 2024-03-20 RX ORDER — METOPROLOL SUCCINATE 25 MG/1
25 TABLET, EXTENDED RELEASE ORAL DAILY
Qty: 30 TABLET | Refills: 1 | Status: SHIPPED | OUTPATIENT
Start: 2024-03-20

## 2024-03-20 NOTE — TELEPHONE ENCOUNTER
----- Message from Didi Rivas sent at 3/19/2024  6:29 PM EDT -----  Regarding: holter monitor results  Contact: [phone]  I just wanted to double check if the Metoprolol XL 25mg is being sent to [facility] in [place]. I leave for vacation on Monday and wanted to ensure I could pick it up prior to ensure I am taking it and can discuss the rest on Friday next week.

## 2024-03-20 NOTE — TELEPHONE ENCOUNTER
"Patient asking about metoprolol that you reference in the below result message:  \"Dr Pritchett has reviewed holter monitor and advised the following \"Patient's Holter monitor came back showing that during her symptomatic episodes, she had a normal heart rhythm, normal heart rhythm at a fast pace with PVCs (extra beats from the lower chambers of her heart).  It is felt that her symptoms are likely due to the PVCs that are happening.   Per chart review it looks like she was on propranolol in the past (beta-blocker and those are commonly used for treatment of something like this).  We can try metoprolol XL 25 mg daily if she is agreeable.  Let me know which she decides \"  Let me know if you are ok with starting metoprolol and what pharmacy you want this to go to\"    I have attached med below for you to attached diagnosis and approve      "

## 2024-03-26 RX ORDER — TRIAMCINOLONE ACETONIDE 55 UG/1
SPRAY, METERED NASAL
COMMUNITY
Start: 2024-03-04

## 2024-03-26 RX ORDER — VENLAFAXINE HYDROCHLORIDE 37.5 MG/1
CAPSULE, EXTENDED RELEASE ORAL
COMMUNITY
Start: 2024-02-29

## 2024-03-26 NOTE — PROGRESS NOTES
"Subjective   Didi Rivas is a 43 y.o. female.   Chief Complaint   Patient presents with    Palpitations    Fatigue       History of Present Illness     Palpitations:  -Follow up from 01/30/2024: Patient was describing palpitations where her heart rate would increase and then slow down.  She describes some lightheadedness and cold sweats.  ECG normal, check her TSH, CBC and CMP.  Ordered Holter monitor  -Patient wanted to review Holter monitor results today.  Discussed with patient that Holter monitor came back showing NSR with a minimum heart rate of 50 bpm and maximum rate of 166 bpm with an average rate of 78.  However during her episodes of palpitations, she had a 2% PVC burden and multiple episodes also showed sinus rhythm or sinus tachycardia.  -Started patient on metoprolol XL 25 mg daily.    -Patient states that she \"has not felt her heartbeat in 2 days\".  She states that she is always been able to feel her heartbeat but since taking the medication she no longer has that sensation.  When she walks, she does not feel as short of breath and she overall feels much better on the medication      Fatigue:  -Follow up from 01/30/2024: Check vitamin B12, ferritin/iron, A1c, CBC, CMP, lipid, hemoglobin A1c, vitamin D and TSH  -Patient noticed a great improvement in fatigue with metoprolol per above  -May also be related to other issues that she has per below    Anxiety and depression/Inattention  -Follow-up from 1/30/2024: Patient has had side effects or no improvement with multiple medications, we started sertraline 25 mg and gave her a referral to [facility] for psychiatry  - has had 3-4 appointments with [facility] already. Told her she had anxiety/depression and Adult ADHD. She increased sertraline to 50mg but resting heart rate was too low and too relaxed. Tried effexor instead, doesn't feel wired but relaxed.   - psychiatry wanted to do Adderall but wanted pt to ask PCP first    Sinus issues  -Saw an ENT " who started her on allergy shots.  They did a nasal scope and told her that her nasal passages were too small.  They did some imaging on her and told her that she likely has a sleep disorder.    - They went ahead and ordered her a sleep study      The following portions of the patient's history were reviewed and updated as appropriate: allergies, current medications, past family history, past medical history, past social history, past surgical history, and problem list.    Patient Active Problem List   Diagnosis    Anxiety    Hypertension, benign    Class 3 severe obesity due to excess calories without serious comorbidity with body mass index (BMI) of 40.0 to 44.9 in adult    Chronic fatigue    Chronic diarrhea    GERD (gastroesophageal reflux disease)    Palpitation    Prediabetes    Mixed hyperlipidemia    Environmental allergies       Current Outpatient Medications on File Prior to Visit   Medication Sig Dispense Refill    albuterol sulfate  (90 Base) MCG/ACT inhaler       Auvi-Q 0.3 MG/0.3ML solution auto-injector injection INJECT AS NEEDED FOR SEVERE ALLERGIC REACTION INCLUDING ANAPHYLAXIS AS DIRECTED      Effexor XR 75 MG 24 hr capsule       esomeprazole (nexIUM) 20 MG capsule       Etonogestrel (NEXPLANON) 68 MG implant subdermal implant Inject  under the skin into the appropriate area as directed. Left arm      fexofenadine (Allegra Allergy) 60 MG tablet       Nasacort Allergy 24HR 55 MCG/ACT nasal inhaler INSTILL 2 SPRAYS IN EACH NOSTRIL ONCE DAILY IN THE MORNING      promethazine-dextromethorphan (PROMETHAZINE-DM) 6.25-15 MG/5ML syrup Take 5 mL by mouth 4 (Four) Times a Day As Needed for Cough. (Patient not taking: Reported on 3/29/2024) 118 mL 0    sertraline (ZOLOFT) 50 MG tablet Take 1 tablet by mouth Daily. (Patient not taking: Reported on 3/29/2024)      venlafaxine XR (EFFEXOR-XR) 37.5 MG 24 hr capsule  (Patient not taking: Reported on 3/29/2024)       No current facility-administered  "medications on file prior to visit.     Current outpatient and discharge medications have been reconciled for the patient.  Reviewed by: Linda Pritchett, DO      No Known Allergies      Objective   Visit Vitals  /82 (BP Location: Right arm, Patient Position: Sitting, Cuff Size: Large Adult)   Pulse 69   Temp 97.8 °F (36.6 °C) (Temporal)   Resp 18   Ht 165.1 cm (65\")   Wt 112 kg (246 lb)   SpO2 99%   BMI 40.94 kg/m²       Physical Exam  HENT:      Head: Normocephalic and atraumatic.   Eyes:      Conjunctiva/sclera: Conjunctivae normal.   Neurological:      General: No focal deficit present.      Mental Status: She is alert and oriented to person, place, and time.   Psychiatric:         Mood and Affect: Mood normal.         Behavior: Behavior normal.           Diagnoses and all orders for this visit:    1. Palpitations (Primary)/fatigue  -   Patient doing well on current regimen.  Continue current regimen  -Refilled metoprolol succinate XL (TOPROL-XL) 25 MG 24 hr tablet; Take 1 tablet by mouth Daily.  Dispense: 90 tablet; Refill: 3    3. Inattention  -Since we have her heart rate under control with the metoprolol, told her that I would be fine if psychiatry wanted to try low-dose Adderall for her ADHD  -Also requested patient to send us the testing and ADHD diagnosis from her psychiatrist we can add it to her file.  Patient verbalized understanding and is agreeable    4. Body mass index (BMI) of 40.0 to 44.9 in adult           Follow Up  -5/10/2024 for annual physical exam    Expected course, medications, and adverse effects discussed as appropriate.  Call or return if worsening or persistent symptoms.  I wore protective equipment throughout this patient encounter to include mask and eye protection. Hand hygiene was performed before donning protective equipment and after removal when leaving the room.    This document is intended for medical expert use only. Reading of this document by patients and/or patient's " family without participating medical staff guidance may result in misinterpretation and unintended morbidity. Any interpretation of such data is the responsibility of the patient and/or family member responsible for the patient in concert with their primary or specialist providers, not to be left for sources of online searches such as [employer], [employer]ailo or similar queries. Relying on these approaches to knowledge may result in misinterpretation, misguided goals of care and even death should patients or family members try recommendations outside of the realm of professional medical care.

## 2024-03-29 ENCOUNTER — OFFICE VISIT (OUTPATIENT)
Dept: FAMILY MEDICINE CLINIC | Facility: CLINIC | Age: 44
End: 2024-03-29
Payer: COMMERCIAL

## 2024-03-29 ENCOUNTER — TELEPHONE (OUTPATIENT)
Dept: FAMILY MEDICINE CLINIC | Facility: CLINIC | Age: 44
End: 2024-03-29

## 2024-03-29 VITALS
DIASTOLIC BLOOD PRESSURE: 82 MMHG | TEMPERATURE: 97.8 F | HEIGHT: 65 IN | RESPIRATION RATE: 18 BRPM | HEART RATE: 69 BPM | WEIGHT: 246 LBS | BODY MASS INDEX: 40.98 KG/M2 | SYSTOLIC BLOOD PRESSURE: 124 MMHG | OXYGEN SATURATION: 99 %

## 2024-03-29 DIAGNOSIS — R41.840 INATTENTION: ICD-10-CM

## 2024-03-29 DIAGNOSIS — R53.83 OTHER FATIGUE: ICD-10-CM

## 2024-03-29 DIAGNOSIS — R00.2 PALPITATIONS: Primary | ICD-10-CM

## 2024-03-29 PROCEDURE — 99214 OFFICE O/P EST MOD 30 MIN: CPT | Performed by: STUDENT IN AN ORGANIZED HEALTH CARE EDUCATION/TRAINING PROGRAM

## 2024-03-29 RX ORDER — METOPROLOL SUCCINATE 25 MG/1
25 TABLET, EXTENDED RELEASE ORAL DAILY
Qty: 90 TABLET | Refills: 3 | Status: SHIPPED | OUTPATIENT
Start: 2024-03-29

## 2024-03-29 NOTE — TELEPHONE ENCOUNTER
Patient's psychiatrist asked patient to inform you that they are going to try Didi on Adderall and see if that helps

## 2024-04-01 PROBLEM — O12.10 PROTEINURIA AFFECTING PREGNANCY: Status: RESOLVED | Noted: 2023-11-04 | Resolved: 2024-04-01

## 2024-04-01 PROBLEM — Z91.09 ENVIRONMENTAL ALLERGIES: Status: ACTIVE | Noted: 2024-04-01

## 2024-04-01 PROBLEM — O36.0990 RHESUS ISOIMMUNIZATION AFFECTING PREGNANCY: Status: RESOLVED | Noted: 2023-11-04 | Resolved: 2024-04-01

## 2024-04-01 PROBLEM — O41.00X0 OLIGOHYDRAMNIOS: Status: RESOLVED | Noted: 2023-11-04 | Resolved: 2024-04-01

## 2024-04-01 PROBLEM — I49.5 TACHYCARDIA-BRADYCARDIA: Status: RESOLVED | Noted: 2021-09-05 | Resolved: 2024-04-01

## 2024-04-16 ENCOUNTER — TELEPHONE (OUTPATIENT)
Dept: FAMILY MEDICINE CLINIC | Facility: CLINIC | Age: 44
End: 2024-04-16
Payer: COMMERCIAL

## 2024-04-16 NOTE — TELEPHONE ENCOUNTER
----- Message from Leydi Miller MA sent at 4/16/2024  3:43 PM EDT -----  Regarding: FW: Heart  Contact: [phone]    ----- Message -----  From: Didi Rivas  Sent: 4/16/2024   3:41 PM EDT  To: Zan Hernandez  Clinical Pool  Subject: Heart                                            The psychologist asked if you thought the beta blocker was part of the low rate?

## 2024-04-16 NOTE — TELEPHONE ENCOUNTER
The short answer to her question is yes, the beta-blocker will decrease heart rate.  But would love to have a conversation with patient.  Can we organize may be a video visit (for convenience's sake)?  I would like to have a rodriguez conversation to see what we might need to do next      ---------------------------------------------------------------------------------------------------------------------------------  Hi. I started taking a beta blocker in march and also was taking Effexor. Since then my psychologist added a low dose of adderal.      Since taking the med I have moved the Effexor from morning to night a few times to see if it helped with palpitations.      When taking all 3 for the full week I feel good but I was trying not to do adderall on the weekend and when I do this I start yawning excessively, feeling my heart again in weird beats, and rate between .      After this weekend I did the adderall, beta blocker and Effexor again yesterday and today. I have still felt my heart but it’s not as much as this weekend but I am yawning, taking deeper breaths and the rate still is low but not in the 30s.      Heart when it’s weird feels very slow and also when u do the ekg on my watch it shows a big curve then a little one and then two more little ones.      I talked to my psychologist and she recommended we try non stimulant adhd but also to follow up with you around dropping heart rate.    The psychologist asked if you thought the beta blocker was part of the low rate?     0

## 2024-04-18 ENCOUNTER — TELEMEDICINE (OUTPATIENT)
Dept: FAMILY MEDICINE CLINIC | Facility: CLINIC | Age: 44
End: 2024-04-18
Payer: COMMERCIAL

## 2024-04-18 DIAGNOSIS — E66.01 CLASS 3 SEVERE OBESITY DUE TO EXCESS CALORIES WITHOUT SERIOUS COMORBIDITY WITH BODY MASS INDEX (BMI) OF 40.0 TO 44.9 IN ADULT: ICD-10-CM

## 2024-04-18 DIAGNOSIS — T88.7XXA MEDICATION SIDE EFFECT: Primary | ICD-10-CM

## 2024-04-18 RX ORDER — DEXTROAMPHETAMINE SACCHARATE, AMPHETAMINE ASPARTATE MONOHYDRATE, DEXTROAMPHETAMINE SULFATE AND AMPHETAMINE SULFATE 2.5; 2.5; 2.5; 2.5 MG/1; MG/1; MG/1; MG/1
CAPSULE, EXTENDED RELEASE ORAL
COMMUNITY
Start: 2024-04-01

## 2024-04-18 RX ORDER — METHYLPHENIDATE HYDROCHLORIDE 18 MG/1
TABLET ORAL
COMMUNITY
Start: 2024-04-09 | End: 2024-04-18

## 2024-04-18 RX ORDER — DEXTROAMPHETAMINE SACCHARATE, AMPHETAMINE ASPARTATE, DEXTROAMPHETAMINE SULFATE AND AMPHETAMINE SULFATE 2.5; 2.5; 2.5; 2.5 MG/1; MG/1; MG/1; MG/1
TABLET ORAL
COMMUNITY
Start: 2024-04-02

## 2024-04-18 RX ORDER — PREDNISONE 20 MG/1
TABLET ORAL
COMMUNITY
Start: 2024-04-08 | End: 2024-04-18

## 2024-04-18 RX ORDER — AMOXICILLIN AND CLAVULANATE POTASSIUM 875; 125 MG/1; MG/1
1 TABLET, FILM COATED ORAL
COMMUNITY
Start: 2024-04-08 | End: 2024-04-18

## 2024-04-18 RX ORDER — SCOLOPAMINE TRANSDERMAL SYSTEM 1 MG/1
1 PATCH, EXTENDED RELEASE TRANSDERMAL
COMMUNITY
Start: 2024-04-08 | End: 2024-05-08

## 2024-04-18 NOTE — PROGRESS NOTES
Subjective   Didi Rivas is a 43 y.o. female.   Chief Complaint   Patient presents with    Medication Problem       History of Present Illness     I performed this visit using real-time telehealth tools, including a video-conference connection between my location and the patient's location. Prior to initiating the services, patient told me their full name and date of birth and I obtained the patient's informed verbal consent on 04/18/24 to perform this visit per patient request using the telehealth tools. I answered all  questions the patient had about the telehealth interaction.    Originating Site (patient's location): Home    Distant Site (provider's location): [address]  [facility]  [address]  [place] [address]    Total time spent on medical discussion:  I spent 13.30 minutes with the patient, over half of which was spent in counseling and coordination  of care      Medication side effects:  -Follow-up from 3/29/2024: Patient had palpitations that were found to be sinus tachycardia via Holter monitor.  Had started patient on metoprolol XL 25 mg daily and she had resolution of symptoms and felt much better  -Patient had been on metoprolol for at least a few weeks in conjunction with Effexor XR 75 mg and no negative reaction  -Patient has been evaluated by psychiatry and diagnosed with ADHD.  Psychiatry wanted to try a stimulant to treat ADHD now that her heart rate had been controlled.  Told patient that be fine to try  -Patient started Adderall 10 mg daily but would skip on the weekends.  She started getting a few, short episodes of palpitations but on the weekends when she would not take her Adderall, her heart rate would slow down to the 30s (patient wears a Fitbit)  -Patient denies dizziness or feeling lightheaded during these episodes  -Patient's recent blood pressure at [facility] on 4/8/2024 was 124/68  -Patient has reported this to her  psychiatrist who wants to try a nonstimulant on patient        The following portions of the patient's history were reviewed and updated as appropriate: allergies, current medications, past family history, past medical history, past social history, past surgical history, and problem list.    Patient Active Problem List   Diagnosis    Anxiety    Hypertension, benign    Class 3 severe obesity due to excess calories without serious comorbidity with body mass index (BMI) of 40.0 to 44.9 in adult    Chronic fatigue    Chronic diarrhea    GERD (gastroesophageal reflux disease)    Palpitation    Prediabetes    Mixed hyperlipidemia    Environmental allergies       Current Outpatient Medications on File Prior to Visit   Medication Sig Dispense Refill    albuterol sulfate  (90 Base) MCG/ACT inhaler       amoxicillin-clavulanate (AUGMENTIN) 875-125 MG per tablet Take 1 tablet by mouth.      amphetamine-dextroamphetamine XR (ADDERALL XR) 10 MG 24 hr capsule TAKE 1 CAPSULE BY MOUTH 1 time a DAY      Effexor XR 75 MG 24 hr capsule       esomeprazole (nexIUM) 20 MG capsule       Etonogestrel (NEXPLANON) 68 MG implant subdermal implant Inject  under the skin into the appropriate area as directed. Left arm      fexofenadine (Allegra Allergy) 60 MG tablet       metoprolol succinate XL (TOPROL-XL) 25 MG 24 hr tablet Take 1 tablet by mouth Daily. 90 tablet 3    Nasacort Allergy 24HR 55 MCG/ACT nasal inhaler INSTILL 2 SPRAYS IN EACH NOSTRIL ONCE DAILY IN THE MORNING      Scopolamine 1 MG/3DAYS patch Place 1 patch on the skin as directed by provider Every 72 (Seventy-Two) Hours.      venlafaxine XR (EFFEXOR-XR) 37.5 MG 24 hr capsule       [DISCONTINUED] methylphenidate 18 MG CR tablet Take 1 oral tablet 1 time a day      [DISCONTINUED] predniSONE (DELTASONE) 20 MG tablet TAKE 3 TABLETS BY MOUTH DAILY FOR 5 DAYS      amphetamine-dextroamphetamine (ADDERALL) 10 MG tablet  (Patient not taking: Reported on 4/18/2024)      Auvi-Q 0.3  MG/0.3ML solution auto-injector injection INJECT AS NEEDED FOR SEVERE ALLERGIC REACTION INCLUDING ANAPHYLAXIS AS DIRECTED (Patient not taking: Reported on 4/18/2024)      promethazine-dextromethorphan (PROMETHAZINE-DM) 6.25-15 MG/5ML syrup Take 5 mL by mouth 4 (Four) Times a Day As Needed for Cough. (Patient not taking: Reported on 3/29/2024) 118 mL 0    [DISCONTINUED] sertraline (ZOLOFT) 50 MG tablet Take 1 tablet by mouth Daily. (Patient not taking: Reported on 3/29/2024)       No current facility-administered medications on file prior to visit.     Current outpatient and discharge medications have been reconciled for the patient.  Reviewed by: Linda Pritchett, DO      No Known Allergies      Objective   There were no vitals taken for this visit.  -Unable to collect vitals during telehealth visit    Diagnoses and all orders for this visit:    1. Medication side effect (Primary)  -Patient's Effexor works very well for her and the metoprolol worked very well for her as well.  Seemed to do fine on the combination of these 2 medications  -Recommended patient stop the metoprolol and the Adderall for about 3 to 5 days, then restart the metoprolol with her Effexor only to see if she gets the same benefit from both medications without side effect  -If she can do well back on both these medications without side effect, asked her to then later on the nonstimulant medication for ADHD to see if she can tolerate that.  -If she cannot, asked her to call us and we will try to make an appointment to talk about other options for palpitations      2. Class 3 severe obesity due to excess calories without serious comorbidity with body mass index (BMI) of 40.0 to 44.9 in adult           Follow Up  - prn    Expected course, medications, and adverse effects discussed as appropriate.  Call or return if worsening or persistent symptoms.  I wore protective equipment throughout this patient encounter to include mask and eye protection. Hand  hygiene was performed before donning protective equipment and after removal when leaving the room.    This document is intended for medical expert use only. Reading of this document by patients and/or patient's family without participating medical staff guidance may result in misinterpretation and unintended morbidity. Any interpretation of such data is the responsibility of the patient and/or family member responsible for the patient in concert with their primary or specialist providers, not to be left for sources of online searches such as [employer], [employer] or similar queries. Relying on these approaches to knowledge may result in misinterpretation, misguided goals of care and even death should patients or family members try recommendations outside of the realm of professional medical care.

## 2024-05-10 ENCOUNTER — OFFICE VISIT (OUTPATIENT)
Dept: FAMILY MEDICINE CLINIC | Facility: CLINIC | Age: 44
End: 2024-05-10
Payer: COMMERCIAL

## 2024-05-10 ENCOUNTER — TELEPHONE (OUTPATIENT)
Dept: FAMILY MEDICINE CLINIC | Facility: CLINIC | Age: 44
End: 2024-05-10

## 2024-05-10 VITALS
SYSTOLIC BLOOD PRESSURE: 118 MMHG | BODY MASS INDEX: 40.02 KG/M2 | WEIGHT: 240.2 LBS | HEIGHT: 65 IN | RESPIRATION RATE: 16 BRPM | HEART RATE: 95 BPM | OXYGEN SATURATION: 96 % | DIASTOLIC BLOOD PRESSURE: 60 MMHG | TEMPERATURE: 98.6 F

## 2024-05-10 DIAGNOSIS — R23.3 EASY BRUISING: ICD-10-CM

## 2024-05-10 DIAGNOSIS — R61 EXCESSIVE SWEATING: ICD-10-CM

## 2024-05-10 DIAGNOSIS — E66.09 CLASS 2 OBESITY DUE TO EXCESS CALORIES WITHOUT SERIOUS COMORBIDITY WITH BODY MASS INDEX (BMI) OF 39.0 TO 39.9 IN ADULT: ICD-10-CM

## 2024-05-10 DIAGNOSIS — Z00.00 ANNUAL PHYSICAL EXAM: Primary | ICD-10-CM

## 2024-05-10 PROBLEM — G47.33 OSA ON CPAP: Status: ACTIVE | Noted: 2024-05-10

## 2024-05-18 DIAGNOSIS — R00.2 PALPITATIONS: ICD-10-CM

## 2024-05-20 RX ORDER — METOPROLOL SUCCINATE 25 MG/1
25 TABLET, EXTENDED RELEASE ORAL DAILY
Qty: 90 TABLET | Refills: 1 | Status: SHIPPED | OUTPATIENT
Start: 2024-05-20

## 2024-05-21 LAB
APTT PPP: 27 SEC (ref 24–33)
INR PPP: 0.9 (ref 0.9–1.2)
PROTHROMBIN TIME: 10.1 SEC (ref 9.1–12)

## 2024-05-22 LAB
BASOPHILS # BLD AUTO: 0 X10E3/UL (ref 0–0.2)
BASOPHILS NFR BLD AUTO: 1 %
EOSINOPHIL # BLD AUTO: 0.1 X10E3/UL (ref 0–0.4)
EOSINOPHIL NFR BLD AUTO: 2 %
ERYTHROCYTE [DISTWIDTH] IN BLOOD BY AUTOMATED COUNT: 13.3 % (ref 11.7–15.4)
HCT VFR BLD AUTO: 39.6 % (ref 34–46.6)
HGB BLD-MCNC: 12.2 G/DL (ref 11.1–15.9)
IMM GRANULOCYTES # BLD AUTO: 0 X10E3/UL (ref 0–0.1)
IMM GRANULOCYTES NFR BLD AUTO: 0 %
LYMPHOCYTES # BLD AUTO: 1.8 X10E3/UL (ref 0.7–3.1)
LYMPHOCYTES NFR BLD AUTO: 24 %
MCH RBC QN AUTO: 26 PG (ref 26.6–33)
MCHC RBC AUTO-ENTMCNC: 30.8 G/DL (ref 31.5–35.7)
MCV RBC AUTO: 84 FL (ref 79–97)
MONOCYTES # BLD AUTO: 0.6 X10E3/UL (ref 0.1–0.9)
MONOCYTES NFR BLD AUTO: 8 %
NEUTROPHILS # BLD AUTO: 5 X10E3/UL (ref 1.4–7)
NEUTROPHILS NFR BLD AUTO: 65 %
PATH INTERP BLD-IMP: ABNORMAL
PATH REV BLD -IMP: ABNORMAL
PATHOLOGIST NAME: ABNORMAL
PLATELET # BLD AUTO: 271 X10E3/UL (ref 150–450)
RBC # BLD AUTO: 4.69 X10E6/UL (ref 3.77–5.28)
WBC # BLD AUTO: 7.6 X10E3/UL (ref 3.4–10.8)

## 2024-05-24 LAB — PHYTONADIONE SERPL-MCNC: 1.48 NG/ML (ref 0.1–2.2)

## 2024-06-17 ENCOUNTER — HOSPITAL ENCOUNTER (OUTPATIENT)
Dept: MAMMOGRAPHY | Facility: HOSPITAL | Age: 44
Discharge: HOME | End: 2024-06-17
Payer: COMMERCIAL

## 2024-06-17 ENCOUNTER — HOSPITAL ENCOUNTER (OUTPATIENT)
Dept: ULTRASOUND IMAGING | Facility: HOSPITAL | Age: 44
Discharge: HOME | End: 2024-06-17
Payer: COMMERCIAL

## 2024-06-17 DIAGNOSIS — N63.20 MASS OF LEFT BREAST, UNSPECIFIED QUADRANT: ICD-10-CM

## 2024-06-17 PROCEDURE — 77066 DX MAMMO INCL CAD BI: CPT

## 2024-06-17 PROCEDURE — G0279 TOMOSYNTHESIS, MAMMO: HCPCS

## 2024-06-17 PROCEDURE — 76642 ULTRASOUND BREAST LIMITED: CPT

## 2024-07-12 ENCOUNTER — TRANSCRIBE ORDERS (OUTPATIENT)
Dept: ADMINISTRATIVE | Facility: HOSPITAL | Age: 44
End: 2024-07-12
Payer: COMMERCIAL

## 2024-07-12 DIAGNOSIS — Z12.31 VISIT FOR SCREENING MAMMOGRAM: Primary | ICD-10-CM

## 2024-08-29 ENCOUNTER — PATIENT MESSAGE (OUTPATIENT)
Dept: FAMILY MEDICINE CLINIC | Facility: CLINIC | Age: 44
End: 2024-08-29
Payer: COMMERCIAL

## 2024-08-29 ENCOUNTER — TELEPHONE (OUTPATIENT)
Dept: FAMILY MEDICINE CLINIC | Facility: CLINIC | Age: 44
End: 2024-08-29
Payer: COMMERCIAL

## 2024-08-29 NOTE — TELEPHONE ENCOUNTER
"From: Didi Rivas  To: Linda Pritchett  Sent: 8/29/2024 7:57 AM EDT  Subject: Metoprolol - Not as effective    Hi,   I have been taking the Metoprolol daily since you prescribed it and for a few months it felt like it took away the \"heart feeling\" and the yawning and over the last month (with no other changes to any medications or anything) I have started feeling the \"heart feeling\" again and yawning all day again and feeling that exhaustion again. is this a medicine that you increase dose when you feel these things again or do you feel something else could be going on?   "

## 2024-08-29 NOTE — TELEPHONE ENCOUNTER
"----- Message from [facility] [employer] sent at 8/29/2024  7:57 AM EDT -----  Regarding: Metoprolol - Not as effective  Contact: [phone]  Hi,   I have been taking the Metoprolol daily since you prescribed it and for a few months it felt like it took away the \"heart feeling\" and the yawning and over the last month (with no other changes to any medications or anything) I have started feeling the \"heart feeling\" again and yawning all day again and feeling that exhaustion again. is this a medicine that you increase dose when you feel these things again or do you feel something else could be going on?   "

## 2024-09-04 NOTE — PROGRESS NOTES
Subjective   Didi Rivas is a 43 y.o. female.   Chief Complaint   Patient presents with    Palpitations       History of Present Illness     Palpitations/Fatigue  -Follow up from 5/10/2024:  Continued Metoprolol Succinate XL 25 mg as patient states that it took away the feeling of palpitations and yawning  -Received a message on 8/29/2024 stating that she started having return of the symptoms and feeling exhausted again even though she is continuing to take the metoprolol  - symptoms: fatigue, yawning, feeling palpitations, brain fog, ADHD (current regimen no longer working), hair falling out and feels like she's getting more chin whiskers   - Wears a CPAP for OTIS and follows up with provider, will see in December    The following portions of the patient's history were reviewed and updated as appropriate: allergies, current medications, past family history, past medical history, past social history, past surgical history, and problem list.    Patient Active Problem List   Diagnosis    Anxiety    Hypertension, benign    Class 3 severe obesity due to excess calories without serious comorbidity with body mass index (BMI) of 40.0 to 44.9 in adult    Chronic fatigue    Chronic diarrhea    GERD (gastroesophageal reflux disease)    Palpitation    Prediabetes    Mixed hyperlipidemia    Environmental allergies    OTIS on CPAP       Current Outpatient Medications on File Prior to Visit   Medication Sig Dispense Refill    albuterol sulfate  (90 Base) MCG/ACT inhaler       amphetamine-dextroamphetamine (ADDERALL) 5 MG tablet Take 1 tablet by mouth Daily.      amphetamine-dextroamphetamine XR (ADDERALL XR) 15 MG 24 hr capsule Take 1 tablet by mouth Daily      Auvi-Q 0.3 MG/0.3ML solution auto-injector injection       Effexor XR 75 MG 24 hr capsule       esomeprazole (nexIUM) 20 MG capsule As Needed.      Etonogestrel (NEXPLANON) 68 MG implant subdermal implant Inject  under the skin into the appropriate area as  "directed. Left arm      fexofenadine (Allegra Allergy) 60 MG tablet       metoprolol succinate XL (TOPROL-XL) 25 MG 24 hr tablet TAKE 1 TABLET BY MOUTH DAILY 90 tablet 1    Nasacort Allergy 24HR 55 MCG/ACT nasal inhaler INSTILL 2 SPRAYS IN EACH NOSTRIL ONCE DAILY IN THE MORNING       No current facility-administered medications on file prior to visit.     Current outpatient and discharge medications have been reconciled for the patient.  Reviewed by: Linda Pritchett,       No Known Allergies      Objective   Visit Vitals  /60 (BP Location: Left arm, Patient Position: Sitting, Cuff Size: Large Adult)   Pulse 70   Temp 98.1 °F (36.7 °C) (Skin)   Resp 16   Ht 165.1 cm (65\")   Wt 103 kg (226 lb 12.8 oz)   SpO2 98%   BMI 37.74 kg/m²       Physical Exam  HENT:      Head: Normocephalic and atraumatic.   Eyes:      Conjunctiva/sclera: Conjunctivae normal.   Cardiovascular:      Rate and Rhythm: Normal rate and regular rhythm.      Heart sounds: Normal heart sounds.   Pulmonary:      Effort: Pulmonary effort is normal. No respiratory distress.      Breath sounds: Normal breath sounds. No wheezing, rhonchi or rales.   Neurological:      General: No focal deficit present.      Mental Status: She is alert and oriented to person, place, and time.   Psychiatric:         Mood and Affect: Mood normal.         Behavior: Behavior normal.           Diagnoses and all orders for this visit:    1. Palpitations (Primary)/ Hair loss/ Other fatigue  -   Discussed with patient that I like to check some of her labs first to see if they become abnormal which might be contributing to a lot of her symptoms  -   TSH Rfx On Abnormal To Free T4  -     Hemoglobin A1c  -     Testosterone  -     Iron Profile  -     Ferritin  -     CBC & Differential  -     Comprehensive metabolic panel  -     DAVID  -     C-reactive protein  -Advised her to continue her metoprolol as is for now    4. Class 2 obesity due to excess calories without serious " comorbidity with body mass index (BMI) of 39.0 to 39.9 in adult           Follow Up  -Would like to get lab work back first before deciding when to see her back for follow-up  -5/16/2025 for annual physical exam    Expected course, medications, and adverse effects discussed as appropriate.  Call or return if worsening or persistent symptoms. Hand hygiene was performed before donning protective equipment and after removal when leaving the room.    This document is intended for medical expert use only. Reading of this document by patients and/or patient's family without participating medical staff guidance may result in misinterpretation and unintended morbidity. Any interpretation of such data is the responsibility of the patient and/or family member responsible for the patient in concert with their primary or specialist providers, not to be left for sources of online searches such as [employer], [employer] or similar queries. Relying on these approaches to knowledge may result in misinterpretation, misguided goals of care and even death should patients or family members try recommendations outside of the realm of professional medical care.

## 2024-09-05 ENCOUNTER — OFFICE VISIT (OUTPATIENT)
Dept: FAMILY MEDICINE CLINIC | Facility: CLINIC | Age: 44
End: 2024-09-05
Payer: COMMERCIAL

## 2024-09-05 VITALS
BODY MASS INDEX: 37.79 KG/M2 | TEMPERATURE: 98.1 F | SYSTOLIC BLOOD PRESSURE: 120 MMHG | OXYGEN SATURATION: 98 % | DIASTOLIC BLOOD PRESSURE: 60 MMHG | HEART RATE: 70 BPM | HEIGHT: 65 IN | RESPIRATION RATE: 16 BRPM | WEIGHT: 226.8 LBS

## 2024-09-05 DIAGNOSIS — L65.9 HAIR LOSS: ICD-10-CM

## 2024-09-05 DIAGNOSIS — R53.83 OTHER FATIGUE: ICD-10-CM

## 2024-09-05 DIAGNOSIS — E66.09 CLASS 2 OBESITY DUE TO EXCESS CALORIES WITHOUT SERIOUS COMORBIDITY WITH BODY MASS INDEX (BMI) OF 39.0 TO 39.9 IN ADULT: ICD-10-CM

## 2024-09-05 DIAGNOSIS — R00.2 PALPITATIONS: Primary | ICD-10-CM

## 2024-09-05 PROCEDURE — 99213 OFFICE O/P EST LOW 20 MIN: CPT | Performed by: STUDENT IN AN ORGANIZED HEALTH CARE EDUCATION/TRAINING PROGRAM

## 2024-09-05 RX ORDER — DEXTROAMPHETAMINE SACCHARATE, AMPHETAMINE ASPARTATE MONOHYDRATE, DEXTROAMPHETAMINE SULFATE AND AMPHETAMINE SULFATE 3.75; 3.75; 3.75; 3.75 MG/1; MG/1; MG/1; MG/1
1 CAPSULE, EXTENDED RELEASE ORAL DAILY
COMMUNITY
Start: 2024-08-02

## 2024-09-05 RX ORDER — DEXTROAMPHETAMINE SACCHARATE, AMPHETAMINE ASPARTATE, DEXTROAMPHETAMINE SULFATE AND AMPHETAMINE SULFATE 1.25; 1.25; 1.25; 1.25 MG/1; MG/1; MG/1; MG/1
1 TABLET ORAL DAILY
COMMUNITY
Start: 2024-08-02

## 2024-09-06 LAB
ALBUMIN SERPL-MCNC: 4.5 G/DL (ref 3.9–4.9)
ALP SERPL-CCNC: 107 IU/L (ref 44–121)
ALT SERPL-CCNC: 18 IU/L (ref 0–32)
ANA SER QL: NEGATIVE
AST SERPL-CCNC: 22 IU/L (ref 0–40)
BASOPHILS # BLD AUTO: 0.1 X10E3/UL (ref 0–0.2)
BASOPHILS NFR BLD AUTO: 0 %
BILIRUB SERPL-MCNC: 0.3 MG/DL (ref 0–1.2)
BUN SERPL-MCNC: 13 MG/DL (ref 6–24)
BUN/CREAT SERPL: 18 (ref 9–23)
CALCIUM SERPL-MCNC: 9.6 MG/DL (ref 8.7–10.2)
CHLORIDE SERPL-SCNC: 105 MMOL/L (ref 96–106)
CO2 SERPL-SCNC: 25 MMOL/L (ref 20–29)
CREAT SERPL-MCNC: 0.72 MG/DL (ref 0.57–1)
CRP SERPL-MCNC: 3 MG/L (ref 0–10)
EGFRCR SERPLBLD CKD-EPI 2021: 107 ML/MIN/1.73
EOSINOPHIL # BLD AUTO: 0.2 X10E3/UL (ref 0–0.4)
EOSINOPHIL NFR BLD AUTO: 1 %
ERYTHROCYTE [DISTWIDTH] IN BLOOD BY AUTOMATED COUNT: 12.9 % (ref 11.7–15.4)
FERRITIN SERPL-MCNC: 52 NG/ML (ref 15–150)
GLOBULIN SER CALC-MCNC: 2.9 G/DL (ref 1.5–4.5)
GLUCOSE SERPL-MCNC: 83 MG/DL (ref 70–99)
HBA1C MFR BLD: 5.6 % (ref 4.8–5.6)
HCT VFR BLD AUTO: 41.9 % (ref 34–46.6)
HGB BLD-MCNC: 13.4 G/DL (ref 11.1–15.9)
IMM GRANULOCYTES # BLD AUTO: 0.1 X10E3/UL (ref 0–0.1)
IMM GRANULOCYTES NFR BLD AUTO: 1 %
IRON SATN MFR SERPL: 15 % (ref 15–55)
IRON SERPL-MCNC: 51 UG/DL (ref 27–159)
LYMPHOCYTES # BLD AUTO: 2.9 X10E3/UL (ref 0.7–3.1)
LYMPHOCYTES NFR BLD AUTO: 23 %
MCH RBC QN AUTO: 27 PG (ref 26.6–33)
MCHC RBC AUTO-ENTMCNC: 32 G/DL (ref 31.5–35.7)
MCV RBC AUTO: 85 FL (ref 79–97)
MONOCYTES # BLD AUTO: 0.8 X10E3/UL (ref 0.1–0.9)
MONOCYTES NFR BLD AUTO: 7 %
NEUTROPHILS # BLD AUTO: 8.6 X10E3/UL (ref 1.4–7)
NEUTROPHILS NFR BLD AUTO: 68 %
PLATELET # BLD AUTO: 294 X10E3/UL (ref 150–450)
POTASSIUM SERPL-SCNC: 4.3 MMOL/L (ref 3.5–5.2)
PROT SERPL-MCNC: 7.4 G/DL (ref 6–8.5)
RBC # BLD AUTO: 4.96 X10E6/UL (ref 3.77–5.28)
SODIUM SERPL-SCNC: 140 MMOL/L (ref 134–144)
TESTOST SERPL-MCNC: 9 NG/DL (ref 4–50)
TIBC SERPL-MCNC: 332 UG/DL (ref 250–450)
TSH SERPL DL<=0.005 MIU/L-ACNC: 1.08 UIU/ML (ref 0.45–4.5)
UIBC SERPL-MCNC: 281 UG/DL (ref 131–425)
WBC # BLD AUTO: 12.5 X10E3/UL (ref 3.4–10.8)

## 2024-09-13 ENCOUNTER — TELEPHONE (OUTPATIENT)
Dept: FAMILY MEDICINE CLINIC | Facility: CLINIC | Age: 44
End: 2024-09-13
Payer: COMMERCIAL

## 2024-09-13 NOTE — TELEPHONE ENCOUNTER
----- Message from [employer] sent at 9/13/2024  1:28 PM EDT -----  Regarding: Blood Work Results  Contact: [phone]  Hi, I see the results came back last week and I wanted to check in to see what your thoughts or next steps were?

## 2024-09-27 ENCOUNTER — OFFICE VISIT (OUTPATIENT)
Dept: FAMILY MEDICINE CLINIC | Facility: CLINIC | Age: 44
End: 2024-09-27
Payer: COMMERCIAL

## 2024-09-27 VITALS
SYSTOLIC BLOOD PRESSURE: 110 MMHG | BODY MASS INDEX: 37.49 KG/M2 | TEMPERATURE: 97.1 F | HEART RATE: 98 BPM | HEIGHT: 65 IN | OXYGEN SATURATION: 98 % | DIASTOLIC BLOOD PRESSURE: 64 MMHG | RESPIRATION RATE: 16 BRPM | WEIGHT: 225 LBS

## 2024-09-27 DIAGNOSIS — R00.2 PALPITATIONS: ICD-10-CM

## 2024-09-27 DIAGNOSIS — F41.9 ANXIETY: ICD-10-CM

## 2024-09-27 DIAGNOSIS — I83.891 SYMPTOMATIC VARICOSE VEINS, RIGHT: ICD-10-CM

## 2024-09-27 DIAGNOSIS — E66.812 CLASS 2 SEVERE OBESITY DUE TO EXCESS CALORIES WITH SERIOUS COMORBIDITY AND BODY MASS INDEX (BMI) OF 37.0 TO 37.9 IN ADULT: ICD-10-CM

## 2024-09-27 DIAGNOSIS — E66.01 CLASS 2 SEVERE OBESITY DUE TO EXCESS CALORIES WITH SERIOUS COMORBIDITY AND BODY MASS INDEX (BMI) OF 37.0 TO 37.9 IN ADULT: ICD-10-CM

## 2024-09-27 DIAGNOSIS — L65.9 HAIR LOSS: ICD-10-CM

## 2024-09-27 DIAGNOSIS — R53.83 OTHER FATIGUE: Primary | ICD-10-CM

## 2024-09-27 DIAGNOSIS — F90.9 ATTENTION DEFICIT HYPERACTIVITY DISORDER (ADHD), UNSPECIFIED ADHD TYPE: ICD-10-CM

## 2024-09-27 DIAGNOSIS — Z23 NEED FOR INFLUENZA VACCINATION: ICD-10-CM

## 2024-09-27 PROCEDURE — 90471 IMMUNIZATION ADMIN: CPT | Performed by: STUDENT IN AN ORGANIZED HEALTH CARE EDUCATION/TRAINING PROGRAM

## 2024-09-27 PROCEDURE — 90656 IIV3 VACC NO PRSV 0.5 ML IM: CPT | Performed by: STUDENT IN AN ORGANIZED HEALTH CARE EDUCATION/TRAINING PROGRAM

## 2024-09-27 PROCEDURE — 99214 OFFICE O/P EST MOD 30 MIN: CPT | Performed by: STUDENT IN AN ORGANIZED HEALTH CARE EDUCATION/TRAINING PROGRAM

## 2024-09-27 RX ORDER — BUPROPION HYDROCHLORIDE 150 MG/1
150 TABLET ORAL DAILY
Qty: 30 TABLET | Refills: 1 | Status: SHIPPED | OUTPATIENT
Start: 2024-09-27

## 2024-09-27 RX ORDER — DEXTROAMPHETAMINE SACCHARATE, AMPHETAMINE ASPARTATE, DEXTROAMPHETAMINE SULFATE AND AMPHETAMINE SULFATE 3.75; 3.75; 3.75; 3.75 MG/1; MG/1; MG/1; MG/1
1 TABLET ORAL EVERY MORNING
COMMUNITY
Start: 2024-09-16

## 2024-09-27 NOTE — PROGRESS NOTES
Subjective   Didi Rivas is a 43 y.o. female.   Chief Complaint   Patient presents with    Palpitations       History of Present Illness     Palpitations / Hair loss/  fatigue  -Follow up from 09/05/2024: Patient's palpitations and fatigue initially improved when adding metoprolol succinate XL 25 mg to her regimen due to results on Holter monitor.  She has been working with psychiatry for her ADHD and is also been on some stimulants.  Patient's palpitations and fatigue had eventually returned ordered labs: TSH Rfx On Abnormal To Free T4,  Hemoglobin A1c, Testosterone, Iron Profile, Ferritin,  CBC & Differential, CMP, DAVID (all came back in normal range)  -Pt states no change in symptoms  -Patient called OB/GYN office and they recommended several over-the-counter supplements:  zince, biotin 10,000mcg, vitamin C, vitmain D5,000 iu and iron 65mg every other day and methyl b12    OTIS  -Has a CPAP, does not wear it faithfully.  Worried about 3 to 4 weeks, did not feel any different, does not like the mask.  Has talked with sleep medicine about a few different masks and has tried a few.  Will follow-up with provider in December      Hair loss  -Noticed her hair loss accelerate few months ago  - Per above, testosterone, TSH, iron/ferritin have all come back in normal range  -Patient states that she is very stressed, feels like none of her medication is currently working for her.  Currently sees psychiatry for her anxiety, she states she can make an appointment when she wants to but her current provider will be leaving soon      Varicose veins  -Located just on the right leg near the knee, distal thigh and proximal shin  -Patient states these get very engorged and can sometimes be painful        Preventative  - Pt agreeable to flu shot    The following portions of the patient's history were reviewed and updated as appropriate: allergies, current medications, past family history, past medical history, past social  "history, past surgical history, and problem list.    Patient Active Problem List   Diagnosis    Anxiety    Hypertension, benign    Class 3 severe obesity due to excess calories without serious comorbidity with body mass index (BMI) of 40.0 to 44.9 in adult    Chronic fatigue    Chronic diarrhea    GERD (gastroesophageal reflux disease)    Palpitation    Prediabetes    Mixed hyperlipidemia    Environmental allergies    OTIS on CPAP       Current Outpatient Medications on File Prior to Visit   Medication Sig Dispense Refill    albuterol sulfate  (90 Base) MCG/ACT inhaler       amphetamine-dextroamphetamine (ADDERALL) 15 MG tablet Take 1 tablet by mouth Every Morning.      amphetamine-dextroamphetamine (ADDERALL) 5 MG tablet Take 1 tablet by mouth Daily.      amphetamine-dextroamphetamine XR (ADDERALL XR) 15 MG 24 hr capsule Take 1 tablet by mouth Daily      Auvi-Q 0.3 MG/0.3ML solution auto-injector injection       Effexor XR 75 MG 24 hr capsule       esomeprazole (nexIUM) 20 MG capsule As Needed.      Etonogestrel (NEXPLANON) 68 MG implant subdermal implant Inject  under the skin into the appropriate area as directed. Left arm      fexofenadine (Allegra Allergy) 60 MG tablet       metoprolol succinate XL (TOPROL-XL) 25 MG 24 hr tablet TAKE 1 TABLET BY MOUTH DAILY 90 tablet 1    Nasacort Allergy 24HR 55 MCG/ACT nasal inhaler INSTILL 2 SPRAYS IN EACH NOSTRIL ONCE DAILY IN THE MORNING       No current facility-administered medications on file prior to visit.     Current outpatient and discharge medications have been reconciled for the patient.  Reviewed by: Linda Pritchett, DO      No Known Allergies      Objective   Visit Vitals  /64 (BP Location: Right arm, Patient Position: Sitting, Cuff Size: Large Adult)   Pulse 98   Temp 97.1 °F (36.2 °C) (Skin)   Resp 16   Ht 165.1 cm (65\")   Wt 102 kg (225 lb)   SpO2 98%   BMI 37.44 kg/m²       Physical Exam  HENT:      Head: Normocephalic and atraumatic.   Eyes:      " Conjunctiva/sclera: Conjunctivae normal.   Skin:     Comments: - Right thigh has visible, moderately enlarged varicose veins.  No erythema noted   Neurological:      General: No focal deficit present.      Mental Status: She is alert and oriented to person, place, and time.   Psychiatric:         Mood and Affect: Mood normal.         Behavior: Behavior normal.           Diagnoses and all orders for this visit:    1. Other fatigue (Primary)/palpitations  -     Vitamin D,25-Hydroxy  -     Vitamin B12  -     TSH Rfx On Abnormal To Free T4  -Recommended wearing CPAP faithfully as OTIS can certainly cause fatigue and is very hard on the heart which may be a cause for her return to palpitations.  Recommended she contact sleep medicine about being seen sooner to try to find a mask that works well for her  -Checking vitamin D and vitamin B12 per above due to concern from OB/GYN, recommended patient stop using biotin 2 weeks prior to getting her TSH checked  -Patient currently on metoprolol XL 25 mg daily, heart rate is 98 but blood pressure is 110/64, would be hesitant to increase metoprolol just yet    2. Hair loss  -Iron/ferritin, TSH is normal, DAVID normal.  Will try to address stress as a possible cause of hair loss but will also send referral dermatology for additional investigation into hair loss  -   Ambulatory Referral to Dermatology: [facility] [place]    3. Anxiety/ Attention deficit hyperactivity disorder (ADHD), unspecified ADHD type  -   Starting buPROPion XL (Wellbutrin XL) 150 MG 24 hr tablet; Take 1 tablet by mouth Daily.  Dispense: 30 tablet; Refill: 1  -Wellbutrin to take with Effexor (patient to stop stimulant) in hopes of treating anxiety and possibly ADHD (unsure if the stimulants were increasing her palpitations and decreasing the initial efficacy of her metoprolol, so chose a nonstimulant)    5. Symptomatic varicose veins, right  -     Ambulatory Referral to Vascular Surgery: Dr Hood  [place]    6. Class 2 severe obesity due to excess calories with serious comorbidity and body mass index (BMI) of 37.0 to 37.9 in adult  Class 2 Severe Obesity (BMI >=35 and <=39.9). Obesity-related health conditions include the following: hypertension. Obesity is unchanged. BMI is is above average; BMI management plan is completed. We discussed portion control and increasing exercise.     7. Need for influenza vaccination  -     Fluzone >6mos         Follow Up  - 3 weeks palpitations, fatigue, hair loss, varicose veins    Expected course, medications, and adverse effects discussed as appropriate.  Call or return if worsening or persistent symptoms. Hand hygiene was performed before donning protective equipment and after removal when leaving the room.    This document is intended for medical expert use only. Reading of this document by patients and/or patient's family without participating medical staff guidance may result in misinterpretation and unintended morbidity. Any interpretation of such data is the responsibility of the patient and/or family member responsible for the patient in concert with their primary or specialist providers, not to be left for sources of online searches such as [employer], [employer] or similar queries. Relying on these approaches to knowledge may result in misinterpretation, misguided goals of care and even death should patients or family members try recommendations outside of the realm of professional medical care.

## 2024-09-27 NOTE — PATIENT INSTRUCTIONS
- palpitations and fatigue: use CPAP faithfully, contact sleep medicine about moving up appointment if the mask bothers you (possibly different model) and review settings being appropriate for you  -  check vitamin D, B12 and recheck thyroid due to concern from OBGYN. Stop biotin 2 weeks before getting these done    Hair loss: checked iron, checked TSH and normal. Will try to address stress and making referral to dermatology    Varicose veins, sending vascular referral for procedure    Stress and ADHD: Keep taking effexor and stop stimulant. Replace stimulant with wellbutrin XL 150mg daily (call me if you have issues with this combination). Goal is to help alleviate some stress and treat ADHD

## 2024-09-30 ENCOUNTER — TELEPHONE (OUTPATIENT)
Dept: FAMILY MEDICINE CLINIC | Facility: CLINIC | Age: 44
End: 2024-09-30
Payer: COMMERCIAL

## 2024-09-30 NOTE — TELEPHONE ENCOUNTER
Rosio with [facility] called and said that the phone # they have are disconnected. Please contact her at .  Thanks Tootie

## 2024-10-08 ENCOUNTER — PATIENT MESSAGE (OUTPATIENT)
Dept: FAMILY MEDICINE CLINIC | Facility: CLINIC | Age: 44
End: 2024-10-08
Payer: COMMERCIAL

## 2024-10-08 ENCOUNTER — TELEPHONE (OUTPATIENT)
Dept: FAMILY MEDICINE CLINIC | Facility: CLINIC | Age: 44
End: 2024-10-08
Payer: COMMERCIAL

## 2024-10-08 DIAGNOSIS — F41.9 ANXIETY: Primary | ICD-10-CM

## 2024-10-08 RX ORDER — VENLAFAXINE HYDROCHLORIDE 37.5 MG/1
37.5 CAPSULE, EXTENDED RELEASE ORAL DAILY
Qty: 21 CAPSULE | Refills: 0 | Status: SHIPPED | OUTPATIENT
Start: 2024-10-08

## 2024-10-08 NOTE — TELEPHONE ENCOUNTER
3 weeks of Effexor XR 37.5 mg sent for patient to start weaning down and eventually off the Effexor while maintaining the Wellbutrin XL at 150 mg daily

## 2024-10-08 NOTE — TELEPHONE ENCOUNTER
"Per patient via mychart,  \"Yes I’d like to try it\"    Patient is agreeable to lower dose effexor being sent over and beginning to wean off.  "

## 2024-10-08 NOTE — TELEPHONE ENCOUNTER
From reading her message, taking both the Effexor and Wellbutrin together gave her exhaustion, tiredness, PVCs and so 1.  She started taking Effexor at nighttime and Wellbutrin in the morning to space them out to space them out and see if that would help.  She took the Effexor at night but did not take it with the Wellbutrin and felt wonderful (so she took the Effexor that night, took the Wellbutrin the next morning and her symptoms resolved).  But she had focus issues so she took the Effexor last night and the Wellbutrin this morning  (so same regimen and timing right?) and the PVCs came back  I am only reading this back because I want a make sure I understand what she is telling me    Ultimately, she is asking if she can come off the Effexor and just take Wellbutrin and restart Adderall at 5 or 10 mg to see if it helps.    I am not totally opposed to this if she wants to see if the Wellbutrin alone can help with the anxiety and focus (at least to some degree).  I would like her to taper back on the Effexor for a few weeks and then come off since she is on 75 mg.  Keep up the Wellbutrin  mg daily.  As she comes down on the Effexor and maintains Wellbutrin, I want her to see how she is doing mood wise.  Let's wait on the Adderall just yet, if we are getting good luck with Wellbutrin and it does contribute to some focus, we can actually go up on the dose  If she is agreeable with the plan, let me know and I can send in lower doses of Effexor for her to start weaning down

## 2024-10-08 NOTE — TELEPHONE ENCOUNTER
----- Message from [facility] sent at 10/8/2024  1:36 PM EDT -----  Regarding: ADHD and Anxiety/Depression Meds  Contact: [phone]  Hi,     I was a little concerned when taking the two meds Effexor and Wellbutrin together as I was still having the same amount of exhaustion, tiredness, yawning and PVCS so I decided I would move the Effexor to Night time again. With that being said, I didnt take it yesterday morning with the Wellbutrin and I felt AWESOME yesterday. No pvcs, no exhaustion, no tiredness, no yawning BUT I lacked the focus ...... so last night i took the effextor and today the wellbutrin and the PVCs are back. With that being said, can I try to come off the Effexor, switch it to the Wellbutrin instead and try the Adderall again at the 5 or 10mg (instead of the 15mg) and see if that fixes all of those things?

## 2024-10-25 ENCOUNTER — OFFICE VISIT (OUTPATIENT)
Dept: FAMILY MEDICINE CLINIC | Facility: CLINIC | Age: 44
End: 2024-10-25
Payer: COMMERCIAL

## 2024-10-25 VITALS
WEIGHT: 227.6 LBS | BODY MASS INDEX: 37.92 KG/M2 | OXYGEN SATURATION: 99 % | HEART RATE: 90 BPM | SYSTOLIC BLOOD PRESSURE: 114 MMHG | DIASTOLIC BLOOD PRESSURE: 66 MMHG | HEIGHT: 65 IN | TEMPERATURE: 97.3 F | RESPIRATION RATE: 16 BRPM

## 2024-10-25 DIAGNOSIS — I83.891 SYMPTOMATIC VARICOSE VEINS, RIGHT: ICD-10-CM

## 2024-10-25 DIAGNOSIS — F41.9 ANXIETY: ICD-10-CM

## 2024-10-25 DIAGNOSIS — R53.83 OTHER FATIGUE: Primary | ICD-10-CM

## 2024-10-25 DIAGNOSIS — E66.812 CLASS 2 OBESITY DUE TO EXCESS CALORIES WITHOUT SERIOUS COMORBIDITY WITH BODY MASS INDEX (BMI) OF 37.0 TO 37.9 IN ADULT: ICD-10-CM

## 2024-10-25 DIAGNOSIS — R00.2 PALPITATION: ICD-10-CM

## 2024-10-25 DIAGNOSIS — L65.9 HAIR LOSS: ICD-10-CM

## 2024-10-25 DIAGNOSIS — E66.09 CLASS 2 OBESITY DUE TO EXCESS CALORIES WITHOUT SERIOUS COMORBIDITY WITH BODY MASS INDEX (BMI) OF 37.0 TO 37.9 IN ADULT: ICD-10-CM

## 2024-10-25 DIAGNOSIS — R53.82 CHRONIC FATIGUE: ICD-10-CM

## 2024-10-25 PROCEDURE — 99214 OFFICE O/P EST MOD 30 MIN: CPT | Performed by: STUDENT IN AN ORGANIZED HEALTH CARE EDUCATION/TRAINING PROGRAM

## 2024-10-25 RX ORDER — BUPROPION HYDROCHLORIDE 300 MG/1
300 TABLET ORAL DAILY
Qty: 30 TABLET | Refills: 1 | Status: SHIPPED | OUTPATIENT
Start: 2024-10-25

## 2024-10-25 NOTE — PROGRESS NOTES
Subjective   Didi Rivas is a 43 y.o. female.   Chief Complaint   Patient presents with    Fatigue/palpitations    Alopecia    Vericose veins       History of Present Illness     Fatigue/palpitations  -Follow up from 09/27/2024: Recommended wearing CPAP on a regular basis and to contact sleep medicine to be seen sooner for mask fitting.   Ordered labs (Vitamin D,25-Hydroxy, Vitamin B 12, TSH Rfx On Abnormal To Free T4.   Ordered Vitamin D and Vitamin B 12 per OB/GYN concern.  Pt advised to hold biotin 2 weeks prior to getting TSH drawn.  Continued Metoprolol XL 25 mg 1 tablet daily    -Since last appointment, patient spoke to life stance/psychiatry: She was already weaning down on the Effexor and the asked her to stop it, asked her to continue Wellbutrin 150 mg (could even go up to compensate for the Effexor).  Patient was initially on long-acting Adderall but due to a shortage was switched over to short acting.  Psychiatrist noted that this is when her palpitations started.  She switched patient's Adderall back over to long-acting  -CPAP is helping with her sleep and fatigue during the day  -Patient started taking the long-acting Adderall today, she reports no palpitations, clear thoughts, anxiety is better, no yawning  -Forgot to take metoprolol 1 day and she had return of very high tachycardia, sweating and feeling hot      Hair loss  -Follow up from 09/27/2024.  Referred to Dermatology in [place]  -Pt states she has an appointment with Dermatology 11/19/2024, with Ursula Laurent  -Patient has been noticing that her hair will break off rather than coming out of the roots  -She is only dyed her hair once this year.  Does not blowdry her hair.  Does to hair washes with rev line, conditioner and leaving conditioner on the ends and oil on the tips      Symptomatic varicose veins, right  -Follow up from 09/27/2024: Referred to Vascular surgeon -  Dr Sana Paris  -Pt was called by their office to make an  appointment but she wanted to wait to address this as there are other more pressing issues currently          Preventative  - Offered COVID vaccine, pt will consider    The following portions of the patient's history were reviewed and updated as appropriate: allergies, current medications, past family history, past medical history, past social history, past surgical history, and problem list.    Patient Active Problem List   Diagnosis    Anxiety    Hypertension, benign    Class 3 severe obesity due to excess calories without serious comorbidity with body mass index (BMI) of 40.0 to 44.9 in adult    Chronic fatigue    Chronic diarrhea    GERD (gastroesophageal reflux disease)    Palpitation    Prediabetes    Mixed hyperlipidemia    Environmental allergies    OTIS on CPAP       Current Outpatient Medications on File Prior to Visit   Medication Sig Dispense Refill    albuterol sulfate  (90 Base) MCG/ACT inhaler       amphetamine-dextroamphetamine XR (ADDERALL XR) 15 MG 24 hr capsule Take 1 tablet by mouth Daily      esomeprazole (nexIUM) 20 MG capsule As Needed.      Etonogestrel (NEXPLANON) 68 MG implant subdermal implant Inject  under the skin into the appropriate area as directed. Left arm      fexofenadine (Allegra Allergy) 60 MG tablet       metoprolol succinate XL (TOPROL-XL) 25 MG 24 hr tablet TAKE 1 TABLET BY MOUTH DAILY 90 tablet 1    Nasacort Allergy 24HR 55 MCG/ACT nasal inhaler INSTILL 2 SPRAYS IN EACH NOSTRIL ONCE DAILY IN THE MORNING      [DISCONTINUED] buPROPion XL (Wellbutrin XL) 150 MG 24 hr tablet Take 1 tablet by mouth Daily. 30 tablet 1    [DISCONTINUED] amphetamine-dextroamphetamine (ADDERALL) 15 MG tablet Take 1 tablet by mouth Every Morning.      [DISCONTINUED] amphetamine-dextroamphetamine (ADDERALL) 5 MG tablet Take 1 tablet by mouth Daily.      [DISCONTINUED] Auvi-Q 0.3 MG/0.3ML solution auto-injector injection       [DISCONTINUED] venlafaxine XR (Effexor XR) 37.5 MG 24 hr capsule Take 1  "capsule by mouth Daily. 21 capsule 0     No current facility-administered medications on file prior to visit.     Current outpatient and discharge medications have been reconciled for the patient.  Reviewed by: Linda Pritchett DO      No Known Allergies      Objective   Visit Vitals  /66 (BP Location: Left arm, Patient Position: Sitting, Cuff Size: Large Adult)   Pulse 90   Temp 97.3 °F (36.3 °C) (Skin)   Resp 16   Ht 165.1 cm (65\")   Wt 103 kg (227 lb 9.6 oz)   SpO2 99%   BMI 37.87 kg/m²       Physical Exam  HENT:      Head: Normocephalic and atraumatic.   Eyes:      Conjunctiva/sclera: Conjunctivae normal.   Neurological:      General: No focal deficit present.      Mental Status: She is alert and oriented to person, place, and time.   Psychiatric:         Mood and Affect: Mood normal.         Behavior: Behavior normal.           Diagnoses and all orders for this visit:    1. Other fatigue (Primary)/ Chronic fatigue/ Palpitation  -Symptoms seem improved (hopefully resolved) with stopping Effexor and switching patient from short acting to long-acting Adderall  -Patient still taking Wellbutrin 150 mg daily  -Energy improved with consistent CPAP use    4. Anxiety  -Patient has 2 more weeks of Wellbutrin 150 mg left.  She plans on finishing this and then going up on the higher dose (patient's symptoms are starting to improve, would like to give her a little bit more time before we introduce an increase of the medication)  -   buPROPion XL (Wellbutrin XL) 300 MG 24 hr tablet; Take 1 tablet by mouth Daily.  Dispense: 30 tablet; Refill: 1    5. Hair loss  -Patient has upcoming appointment with dermatology  -Lab work so far is come back in normal range    6. Symptomatic varicose veins, right  -Encouraged patient to call vascular surgery back and make an appointment out in the spring so that she has an appointment established and she can reschedule from there if she needs to    7. Class 2 obesity due to excess " calories without serious comorbidity with body mass index (BMI) of 37.0 to 37.9 in adult          Follow Up  - 6 weeks, palpitations, anxiety, fatigue    Expected course, medications, and adverse effects discussed as appropriate.  Call or return if worsening or persistent symptoms. Hand hygiene was performed before donning protective equipment and after removal when leaving the room.    This document is intended for medical expert use only. Reading of this document by patients and/or patient's family without participating medical staff guidance may result in misinterpretation and unintended morbidity. Any interpretation of such data is the responsibility of the patient and/or family member responsible for the patient in concert with their primary or specialist providers, not to be left for sources of online searches such as [employer], [employer] or similar queries. Relying on these approaches to knowledge may result in misinterpretation, misguided goals of care and even death should patients or family members try recommendations outside of the realm of professional medical care.

## 2024-11-05 ENCOUNTER — TELEPHONE (OUTPATIENT)
Dept: FAMILY MEDICINE CLINIC | Facility: CLINIC | Age: 44
End: 2024-11-05
Payer: COMMERCIAL

## 2024-11-07 ENCOUNTER — TELEPHONE (OUTPATIENT)
Dept: FAMILY MEDICINE CLINIC | Facility: CLINIC | Age: 44
End: 2024-11-07
Payer: COMMERCIAL

## 2024-11-08 ENCOUNTER — TELEPHONE (OUTPATIENT)
Dept: FAMILY MEDICINE CLINIC | Facility: CLINIC | Age: 44
End: 2024-11-08
Payer: COMMERCIAL

## 2024-11-08 DIAGNOSIS — F41.9 ANXIETY: Primary | ICD-10-CM

## 2024-11-08 DIAGNOSIS — F90.9 ATTENTION DEFICIT HYPERACTIVITY DISORDER (ADHD), UNSPECIFIED ADHD TYPE: ICD-10-CM

## 2024-11-08 RX ORDER — VENLAFAXINE HYDROCHLORIDE 37.5 MG/1
37.5 CAPSULE, EXTENDED RELEASE ORAL DAILY
Qty: 30 CAPSULE | Refills: 0 | Status: SHIPPED | OUTPATIENT
Start: 2024-11-08

## 2024-11-08 RX ORDER — BUPROPION HYDROCHLORIDE 150 MG/1
150 TABLET ORAL DAILY
Qty: 30 TABLET | Refills: 0 | Status: SHIPPED | OUTPATIENT
Start: 2024-11-08

## 2024-11-08 NOTE — TELEPHONE ENCOUNTER
Patient has been very good about being compliant with medications and trying to make sure that the appropriate provider is managing them.      Considering she just was on these medications, I will go ahead and give her 30 tablets of Effexor 37.5 mg and a new prescription for Wellbutrin  mg (30 tablets).  This way she can go ahead and start and contact her psych make an appointment and take those back over and adjust as needed

## 2024-11-08 NOTE — TELEPHONE ENCOUNTER
Yes I need to do this. Do you need me to get with the psychiatrist to add that back in?       VICKY response from your suggestions
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
## 2024-12-06 ENCOUNTER — OFFICE VISIT (OUTPATIENT)
Dept: FAMILY MEDICINE CLINIC | Facility: CLINIC | Age: 44
End: 2024-12-06
Payer: COMMERCIAL

## 2024-12-06 VITALS
SYSTOLIC BLOOD PRESSURE: 126 MMHG | TEMPERATURE: 97.7 F | DIASTOLIC BLOOD PRESSURE: 84 MMHG | WEIGHT: 230 LBS | HEIGHT: 65 IN | HEART RATE: 98 BPM | BODY MASS INDEX: 38.32 KG/M2 | OXYGEN SATURATION: 96 % | RESPIRATION RATE: 18 BRPM

## 2024-12-06 DIAGNOSIS — E66.812 CLASS 2 OBESITY DUE TO EXCESS CALORIES WITHOUT SERIOUS COMORBIDITY WITH BODY MASS INDEX (BMI) OF 37.0 TO 37.9 IN ADULT: ICD-10-CM

## 2024-12-06 DIAGNOSIS — R53.83 OTHER FATIGUE: ICD-10-CM

## 2024-12-06 DIAGNOSIS — R00.2 PALPITATIONS: ICD-10-CM

## 2024-12-06 DIAGNOSIS — E66.09 CLASS 2 OBESITY DUE TO EXCESS CALORIES WITHOUT SERIOUS COMORBIDITY WITH BODY MASS INDEX (BMI) OF 37.0 TO 37.9 IN ADULT: ICD-10-CM

## 2024-12-06 DIAGNOSIS — L65.9 HAIR LOSS: ICD-10-CM

## 2024-12-06 DIAGNOSIS — F41.9 ANXIETY: Primary | ICD-10-CM

## 2024-12-06 RX ORDER — METOPROLOL SUCCINATE 25 MG/1
25 TABLET, EXTENDED RELEASE ORAL DAILY
Qty: 90 TABLET | Refills: 3 | Status: SHIPPED | OUTPATIENT
Start: 2024-12-06

## 2024-12-10 DIAGNOSIS — F90.9 ATTENTION DEFICIT HYPERACTIVITY DISORDER (ADHD), UNSPECIFIED ADHD TYPE: ICD-10-CM

## 2024-12-10 DIAGNOSIS — F41.9 ANXIETY: ICD-10-CM

## 2024-12-10 RX ORDER — BUPROPION HYDROCHLORIDE 150 MG/1
150 TABLET ORAL DAILY
Qty: 30 TABLET | Refills: 0 | OUTPATIENT
Start: 2024-12-10

## 2024-12-10 NOTE — TELEPHONE ENCOUNTER
Patient recently saw me and  is getting her psychiatry medications refilled and managed by her psychiatrist that she does telehealth visits with.  Declining what appears to be an automatic refill request through the pharmacy

## 2024-12-13 DIAGNOSIS — F41.9 ANXIETY: ICD-10-CM

## 2024-12-13 DIAGNOSIS — F90.9 ATTENTION DEFICIT HYPERACTIVITY DISORDER (ADHD), UNSPECIFIED ADHD TYPE: ICD-10-CM

## 2024-12-17 DIAGNOSIS — R00.2 PALPITATIONS: ICD-10-CM

## 2024-12-17 RX ORDER — METOPROLOL SUCCINATE 25 MG/1
25 TABLET, EXTENDED RELEASE ORAL DAILY
Qty: 90 TABLET | Refills: 3 | Status: SHIPPED | OUTPATIENT
Start: 2024-12-17

## 2024-12-17 RX ORDER — BUPROPION HYDROCHLORIDE 150 MG/1
150 TABLET ORAL DAILY
Qty: 90 TABLET | Refills: 0 | Status: SHIPPED | OUTPATIENT
Start: 2024-12-17

## 2024-12-17 NOTE — TELEPHONE ENCOUNTER
Patient recently saw me and is getting her psychiatry medications refilled and managed by her psychiatrist that she does telehealth visits with.  I had declined a refill request for refill bupropion thinking that it was an automatic request through the pharmacy.    I will send her a 90-day supply if she wants to get this through the mail-in pharmacy but lets ask her to get the rest of her anxiety medications through her psychiatrist so that they have control over her medication

## 2024-12-17 NOTE — TELEPHONE ENCOUNTER
All green check marks  Last visit 12/6/24  Follow Up  -5/16/2025 for annual physical exam   she is appropriately scheduled  BP Readings from Last 2 Encounters:   12/06/24 126/84   10/25/24 114/66       Refilling med today

## 2025-07-14 NOTE — PROGRESS NOTES
Chief Complaint  Chief Complaint   Patient presents with    Annual Exam    Skin peeling    Possible dehydration       Subjective    History of Present Illness        Didi Rivas presents to [facility] for   Didi Rivas is a 44 y.o. female here for her annual physical with me. Didi is here for coordination of medical care, to discuss health maintenance, disease prevention as well as to followup on medical problems.     Annual Physical Exam  Patient's last Physical Exam was 05/10/2024. Patient's medical history, medications and allergy lists were reviewed and updated.  Activity level is minimal to moderate  Exercises 1 to 2 times per daily.   Appetite is poor.   Feels fairly well with few complaints.   Energy level is fair to poor  Sleeps fairly well.   Patient's last colonoscopy was never.  Patient's last mammogram was 06/14/24.   Patient is doing routine self skin exam daily.   Patient is doing routine self-breast exams daily  Patient's menstrual cycles are:  Nexplanon  Patient reports last pap smear was done:    07/01/2023        Anxiety /Palpitations  - Follow up from 12/06/2024: Fatigue somewhat improved with consistent CPAP use, symptoms resolved after stopping Effexor and switching patient from short acting to long-acting Adderall.  Patient on Wellbutrin  mg to compensate coming down and off Effexor. Increased to 300 mg daily, later  - Patient very emotional with increase.  Telehealth psychiatry brought her back down to 150 mg and restarted Effexor at 75 mg daily, adjusted long-acting Adderall to 20 mg daily  - Patient reported great improvement and no issues with fatigue or palpitations  - She will be seeing psychiatry in person once a year up in [place] (managing mood medications)  Today  - Patient states she is having less palpitations  - Psychiatry  increased venlafaxine to 150 mg daily, increased Adderall to 20 mg daily, added Straterra 40 mg (pt  has not started taking yet), and stopped Wellbutrin  - Patient states she feels has no emotions, her friends are very concerned  - Today   PHQ-9: 10   MELISA-7: 3  - last provider left and will meet new psych in near future      Palpitations  - was on metoprolol succlinate XL 25mg. Psych increased metoprolol XL to 50mg, due to sweating and psych felt it was due to anxiety. No improvement and feels more fatigued on higher dose  - Blood pressure today:  120/64      Left hand edema/ Chronic Left Hand Pain  - Only affects left hand, has carpal tunnel and is seeing hand surgery for management.  States that the left hand  has swollen, the swelling improved but the skin over the back of the hand became very dry and has been flaking off (area currently a little red due to recent sun exposure)  - States that she has numbness and pain over the thenar eminence, thumb, index, ring and middle fingers      Itching/peeling skin  -Symptoms began 1 year ago with itching, peeling 2 months ago on the back of her left hand and also on index, ring and middle finger of left hand  -Treatment includes changing bodywash, thick lotion, moisturizing lotion with slight improvement      Left elbow pain/left shoulder pain/right low back pain/left wrist pain  - States right low back pain is constant, if she rubs the area pain will shoot laterally across the hip  - Left elbow started popping in the last few months, left shoulder pain has started since May/ of this year      Little urination  - Went to an outdoor concert on 7/3-  - Since, she has been urinating twice daily. She states she has been drinking gatorade, liquid IV  - Has chronic numbness from a prior , is not sure if she would have dysuria or pelvic pain.  States baseline low back pain is worse since 2025        Family History   Problem Relation Age of Onset    Thyroid disease Mother     Hypertension Father     Hyperlipidemia Father     COPD Father     Alcohol abuse  Father     Depression Father     Anxiety disorder Sister         Generalized Anxiety    Depression Sister         Mod Depression    Mental illness Sister         ADHD - Combo Type    Rheum arthritis Sister     No Known Problems Brother     Breast cancer Paternal Grandmother     Rheum arthritis Paternal Grandmother     Diabetes Paternal Grandmother     Vision loss Paternal Grandmother     Depression Paternal Grandmother     Squamous cell carcinoma Paternal Grandmother     No Known Problems Son     Ovarian cancer Neg Hx        Social History     Tobacco Use    Smoking status: Never     Passive exposure: Current    Smokeless tobacco: Never   Vaping Use    Vaping status: Never Used   Substance Use Topics    Alcohol use: Yes     Alcohol/week: 1.0 standard drink of alcohol     Types: 1 Glasses of wine per week     Comment: 1 drink weekly    Drug use: Never       Past Surgical History:   Procedure Laterality Date    CARPAL TUNNEL RELEASE Right 2023     SECTION  2015    WISDOM TOOTH EXTRACTION         Patient Active Problem List   Diagnosis    Anxiety    Hypertension, benign    Class 3 severe obesity due to excess calories without serious comorbidity with body mass index (BMI) of 40.0 to 44.9 in adult    Chronic fatigue    Chronic diarrhea    GERD (gastroesophageal reflux disease)    Palpitations    Prediabetes    Mixed hyperlipidemia    Environmental allergies    OTIS on CPAP         Current Outpatient Medications:     albuterol sulfate  (90 Base) MCG/ACT inhaler, , Disp: , Rfl:     amphetamine-dextroamphetamine XR (ADDERALL XR) 20 MG 24 hr capsule, Take 1 capsule by mouth Every Morning, Disp: , Rfl:     clindamycin (CLEOCIN T) 1 % external solution, PART HAIR AND APPLY A FEW DROPS TO AFFECTED AREA TWICE DAILY UNTIL RESOLVED, Disp: , Rfl:     clobetasol propionate (CLOBEX) 0.05 % shampoo, Apply  topically to the appropriate area as directed., Disp: , Rfl:     esomeprazole (nexIUM) 20 MG capsule, As  "Needed., Disp: , Rfl:     Etonogestrel (NEXPLANON) 68 MG implant subdermal implant, Inject  under the skin into the appropriate area as directed. Left arm, Disp: , Rfl:     fexofenadine (Allegra Allergy) 60 MG tablet, , Disp: , Rfl:     metoprolol succinate XL (TOPROL-XL) 25 MG 24 hr tablet, Take 1 tablet by mouth Daily., Disp: 90 tablet, Rfl: 3    Nasacort Allergy 24HR 55 MCG/ACT nasal inhaler, INSTILL 2 SPRAYS IN EACH NOSTRIL ONCE DAILY IN THE MORNING, Disp: , Rfl:     venlafaxine XR (EFFEXOR-XR) 150 MG 24 hr capsule, , Disp: , Rfl:     atomoxetine (STRATTERA) 40 MG capsule, , Disp: , Rfl:     nitrofurantoin, macrocrystal-monohydrate, (MACROBID) 100 MG capsule, Take 1 capsule by mouth 2 (Two) Times a Day for 5 days., Disp: 10 capsule, Rfl: 0    Objective   /64 (BP Location: Left arm, Patient Position: Sitting, Cuff Size: Large Adult)   Pulse 90   Temp 98.1 °F (36.7 °C) (Skin)   Resp 16   Ht 165.1 cm (65\")   Wt 110 kg (242 lb 12.8 oz)   SpO2 99%   BMI 40.40 kg/m²   BP Readings from Last 3 Encounters:   07/15/25 120/64   12/06/24 126/84   10/25/24 114/66     Wt Readings from Last 3 Encounters:   07/15/25 110 kg (242 lb 12.8 oz)   12/06/24 104 kg (230 lb)   10/25/24 103 kg (227 lb 9.6 oz)     Physical Exam  Constitutional:       General: She is not in acute distress.  HENT:      Head: Normocephalic and atraumatic.      Right Ear: Tympanic membrane normal.      Left Ear: Tympanic membrane normal.      Nose: Nose normal.      Mouth/Throat:      Mouth: Mucous membranes are moist.      Pharynx: Oropharynx is clear. No posterior oropharyngeal erythema.   Eyes:      Extraocular Movements: Extraocular movements intact.      Conjunctiva/sclera: Conjunctivae normal.   Neck:      Comments: - Thyroid not enlarged  Cardiovascular:      Rate and Rhythm: Normal rate and regular rhythm.      Heart sounds: Normal heart sounds.   Pulmonary:      Effort: Pulmonary effort is normal.      Breath sounds: Normal breath " sounds. No stridor. No wheezing.   Abdominal:      General: Abdomen is flat. Bowel sounds are normal.      Palpations: Abdomen is soft. There is no mass.      Tenderness: There is no abdominal tenderness. There is no guarding.   Musculoskeletal:         General: No swelling or deformity. Normal range of motion.      Cervical back: Normal range of motion and neck supple.   Skin:     General: Skin is warm and dry.      Capillary Refill: Capillary refill takes less than 2 seconds.      Coloration: Skin is not jaundiced.      Comments: - Some dry skin on dorsum of left hand   Neurological:      General: No focal deficit present.      Mental Status: She is alert and oriented to person, place, and time.      Cranial Nerves: No cranial nerve deficit.      Motor: No weakness.      Coordination: Coordination normal.      Gait: Gait normal.      Deep Tendon Reflexes: Reflexes normal.   Psychiatric:         Mood and Affect: Mood normal.         Behavior: Behavior normal.         Thought Content: Thought content normal.             Assessment and Plan   Diagnoses and all orders for this visit:    1. Annual physical exam (Primary)  -     CBC & Differential  -     Comprehensive metabolic panel  -     Lipid panel  -     Hemoglobin A1c  - Overall normal 44-year-old female  - Breast cancer screening: up to date  - Cervical cancer screen: Will need to request patient signed transfer of information form to get Pap smear added to chart from    2. Palpitations  -   Bring metoprolol succinate XL from 50 mg back to 25mg, no improvement in sweating after increasing dose with psychiatry  -  metoprolol succinate XL (TOPROL-XL) 25 MG 24 hr tablet; Take 1 tablet by mouth Daily.  Dispense: 90 tablet; Refill: 3    3. Anxiety  - Managed by psychiatry    4. Prediabetes  -     Hemoglobin A1c    5. Mixed hyperlipidemia  -     Lipid panel    6. Chronic hand pain, left/ Left elbow pain/ Acute pain of left shoulder  -     Ambulatory Referral to  Physical Therapy for Evaluation & Treatment: Therapy order printed, signed and handed to patient to take to facility of choice  -     Ambulatory Referral to Occupational Therapy for Evaluation & Treatment: Therapy order printed, signed and handed to patient to take to facility of choice  -     US Arterial Doppler Upper Extremity Left    6. Swelling of left hand/itch of skin  -     Duplex Venous Upper Extremity - Left CAR  - After discussing more with patient, it sounds like the flaking skin and itching occurred when the swelling went down in her left hand.  It does not sound like every time her hand swells the itching will come back.  Believe that the itching and flaking skin is from the skin being overstressed during swelling and when the swelling goes down, it will slough off      9. Decreased urination  -     POCT urinalysis dipstick, manual: +1 Blood and leukocytes  -     Urine Culture - Urine, Urine, Clean Catch  -     nitrofurantoin, macrocrystal-monohydrate, (MACROBID) 100 MG capsule; Take 1 capsule by mouth 2 (Two) Times a Day for 5 days.  Dispense: 10 capsule; Refill: 0    10. Drug side effects  -     Comprehensive metabolic panel    12. Class 2 obesity due to excess calories without serious comorbidity with body mass index (BMI) of 39.0 to 39.9 in adult  Class 2 Severe Obesity (BMI >=35 and <=39.9). Obesity-related health conditions include the following: obstructive sleep apnea, hypertension, impaired fasting glucose, dyslipidemias, and GERD. Obesity is worsening. BMI is is above average; BMI management plan is completed. We discussed portion control and increasing exercise.       Follow Up   - 1 year for annual physical exam  - 7/24/2025 to discuss sweating, ran out of time to address all concerns      The patient was counseled regarding nutrition, physical activity, healthy weight, injury prevention, immunizations and preventative health screenings. Expected course, medications, and adverse effects  discussed.  Call or return if worsening or persistent symptoms.  I wore protective equipment throughout this patient encounter including a mask and eye protection.   The complete contents of the Assessment and Plan and Data / Lab Results as documented above have been reviewed and addressed by myself with the patient today as part of an ongoing evaluation / treatment plan.

## 2025-07-15 ENCOUNTER — OFFICE VISIT (OUTPATIENT)
Dept: FAMILY MEDICINE CLINIC | Facility: CLINIC | Age: 45
End: 2025-07-15
Payer: COMMERCIAL

## 2025-07-15 ENCOUNTER — TELEPHONE (OUTPATIENT)
Dept: FAMILY MEDICINE CLINIC | Facility: CLINIC | Age: 45
End: 2025-07-15

## 2025-07-15 ENCOUNTER — RESULTS FOLLOW-UP (OUTPATIENT)
Dept: FAMILY MEDICINE CLINIC | Facility: CLINIC | Age: 45
End: 2025-07-15

## 2025-07-15 VITALS
RESPIRATION RATE: 16 BRPM | HEART RATE: 90 BPM | SYSTOLIC BLOOD PRESSURE: 120 MMHG | DIASTOLIC BLOOD PRESSURE: 64 MMHG | HEIGHT: 65 IN | TEMPERATURE: 98.1 F | BODY MASS INDEX: 40.45 KG/M2 | WEIGHT: 242.8 LBS | OXYGEN SATURATION: 99 %

## 2025-07-15 DIAGNOSIS — F41.9 ANXIETY: ICD-10-CM

## 2025-07-15 DIAGNOSIS — E66.09 CLASS 2 OBESITY DUE TO EXCESS CALORIES WITHOUT SERIOUS COMORBIDITY WITH BODY MASS INDEX (BMI) OF 39.0 TO 39.9 IN ADULT: ICD-10-CM

## 2025-07-15 DIAGNOSIS — M25.522 LEFT ELBOW PAIN: ICD-10-CM

## 2025-07-15 DIAGNOSIS — L29.9 ITCH OF SKIN: ICD-10-CM

## 2025-07-15 DIAGNOSIS — M25.512 ACUTE PAIN OF LEFT SHOULDER: ICD-10-CM

## 2025-07-15 DIAGNOSIS — M79.642 CHRONIC HAND PAIN, LEFT: ICD-10-CM

## 2025-07-15 DIAGNOSIS — M79.89 SWELLING OF LEFT HAND: ICD-10-CM

## 2025-07-15 DIAGNOSIS — E78.2 MIXED HYPERLIPIDEMIA: ICD-10-CM

## 2025-07-15 DIAGNOSIS — G89.29 CHRONIC HAND PAIN, LEFT: ICD-10-CM

## 2025-07-15 DIAGNOSIS — T88.7XXA DRUG SIDE EFFECTS: ICD-10-CM

## 2025-07-15 DIAGNOSIS — E66.812 CLASS 2 OBESITY DUE TO EXCESS CALORIES WITHOUT SERIOUS COMORBIDITY WITH BODY MASS INDEX (BMI) OF 39.0 TO 39.9 IN ADULT: ICD-10-CM

## 2025-07-15 DIAGNOSIS — R73.03 PREDIABETES: ICD-10-CM

## 2025-07-15 DIAGNOSIS — R00.2 PALPITATIONS: ICD-10-CM

## 2025-07-15 DIAGNOSIS — Z00.00 ANNUAL PHYSICAL EXAM: Primary | ICD-10-CM

## 2025-07-15 DIAGNOSIS — R34 DECREASED URINATION: ICD-10-CM

## 2025-07-15 LAB
25(OH)D3+25(OH)D2 SERPL-MCNC: 30.6 NG/ML (ref 30–100)
BILIRUB BLD-MCNC: NEGATIVE MG/DL
CLARITY, POC: CLEAR
COLOR UR: YELLOW
GLUCOSE UR STRIP-MCNC: NEGATIVE MG/DL
KETONES UR QL: NEGATIVE
LEUKOCYTE EST, POC: ABNORMAL
NITRITE UR-MCNC: NEGATIVE MG/ML
PH UR: 5 [PH] (ref 5–8)
PROT UR STRIP-MCNC: NEGATIVE MG/DL
RBC # UR STRIP: ABNORMAL /UL
SP GR UR: 1.01 (ref 1–1.03)
TSH SERPL DL<=0.005 MIU/L-ACNC: 1.19 UIU/ML (ref 0.45–4.5)
UROBILINOGEN UR QL: NORMAL
VIT B12 SERPL-MCNC: 281 PG/ML (ref 232–1245)

## 2025-07-15 PROCEDURE — 81002 URINALYSIS NONAUTO W/O SCOPE: CPT | Performed by: STUDENT IN AN ORGANIZED HEALTH CARE EDUCATION/TRAINING PROGRAM

## 2025-07-15 PROCEDURE — 99214 OFFICE O/P EST MOD 30 MIN: CPT | Performed by: STUDENT IN AN ORGANIZED HEALTH CARE EDUCATION/TRAINING PROGRAM

## 2025-07-15 PROCEDURE — 99396 PREV VISIT EST AGE 40-64: CPT | Performed by: STUDENT IN AN ORGANIZED HEALTH CARE EDUCATION/TRAINING PROGRAM

## 2025-07-15 RX ORDER — METOPROLOL SUCCINATE 50 MG/1
TABLET, EXTENDED RELEASE ORAL
COMMUNITY
Start: 2025-03-03 | End: 2025-07-15 | Stop reason: DRUGHIGH

## 2025-07-15 RX ORDER — NITROFURANTOIN 25; 75 MG/1; MG/1
100 CAPSULE ORAL 2 TIMES DAILY
Qty: 10 CAPSULE | Refills: 0 | Status: SHIPPED | OUTPATIENT
Start: 2025-07-15 | End: 2025-07-20

## 2025-07-15 RX ORDER — CLINDAMYCIN PHOSPHATE 11.9 MG/ML
SOLUTION TOPICAL
COMMUNITY
Start: 2025-03-21

## 2025-07-15 RX ORDER — VENLAFAXINE HYDROCHLORIDE 150 MG/1
CAPSULE, EXTENDED RELEASE ORAL
COMMUNITY
Start: 2025-03-03

## 2025-07-15 RX ORDER — DEXTROAMPHETAMINE SACCHARATE, AMPHETAMINE ASPARTATE MONOHYDRATE, DEXTROAMPHETAMINE SULFATE AND AMPHETAMINE SULFATE 5; 5; 5; 5 MG/1; MG/1; MG/1; MG/1
20 CAPSULE, EXTENDED RELEASE ORAL EVERY MORNING
COMMUNITY

## 2025-07-15 RX ORDER — ATOMOXETINE 40 MG/1
CAPSULE ORAL
COMMUNITY
Start: 2025-05-05

## 2025-07-15 RX ORDER — METOPROLOL SUCCINATE 25 MG/1
25 TABLET, EXTENDED RELEASE ORAL DAILY
Qty: 90 TABLET | Refills: 3 | Status: SHIPPED | OUTPATIENT
Start: 2025-07-15

## 2025-07-15 RX ORDER — CLOBETASOL PROPIONATE 0.05 G/100ML
SHAMPOO TOPICAL
COMMUNITY
Start: 2025-03-18

## 2025-07-15 NOTE — TELEPHONE ENCOUNTER
Had patient's annual appointment today.  Ran out of time to address the sweating that she has.  We have openings on 7/24/2025 if she wants to get scheduled at that time to discuss     We also addressed musculoskeletal issues and hand swelling, does she want to follow-up in about 8 weeks to make sure these are resolving after physical therapy  and ultrasound testing?

## 2025-07-15 NOTE — PATIENT INSTRUCTIONS
- drink 2.5-3L of fluid daily, include some electrolyte powders        Standardized Exercise Recommendations  - Work up to 150 minutes of aerobic, moderate intensity (you can talk but you can't sing) or 75 minutes of vigorous activity of dedicated exercise a week  - 2 days a week, include resistance/weight training    Light intensity   - Ex: casual walking, light housework, stretching  Moderate Intensity   - brisk walking, water aerobics, ballroom dancing   - breathing will be a little harder with a faster heartbeat  Vigorous Intensity   - jogging/running, aerobic dancing    What are the benefits of movement?  Moving your body has many benefits. It can:  ?Burn calories, which helps people control their weight  ?Help control blood sugar levels in people with diabetes  ?Lower blood pressure, especially in people with high blood pressure  ?Lower stress and help with depression and anxiety  ?Keep bones strong, so they don't get thin and break easily  ?Lower the chance of dying from heart disease  Adding even small amounts of physical activity to your daily routine can improve your health.    What are the main types of exercise?  There are 3 main types of exercise. They are:  ?Aerobic exercise - Aerobic exercise raises a person's heart rate. Examples of aerobic exercise are walking, running, dancing, riding a bike, or swimming.  ?Resistance training - Resistance training helps make your muscles stronger. People can do this type of exercise using weights, exercise bands, or weight machines. You can also do this type of exercise using your own body weight, as with push-ups, or by lifting items in your home, like jugs of water.   ?Stretching - Stretching exercises help your muscles and joints move more easily.  It's important to have all 3 types of exercise in your exercise program. That way, your body, muscles, and joints can be as healthy as possible.    Should I talk to my doctor or nurse before I start exercising?  If  you have not exercised before or have not exercised in a long time, talk with your doctor or nurse before you start a very active exercise program.  If you have heart disease or risk factors for heart disease (like high blood pressure or diabetes), your doctor or nurse might recommend that you have an exercise test before starting an exercise program.  When you start an exercise program, start slowly. For example, do the exercise at a slow pace or for a few minutes only. Over time, you can exercise faster and for longer periods of time.  What should I do when I exercise? -- Each time you exercise, you should:  ?Warm up - Warming up can help keep you from hurting your muscles when you exercise. To warm up, do a light aerobic exercise (such as walking slowly) or stretch for 5 to 10 minutes.  ?Work out - You should try to get a mix of aerobic exercise, resistance training, and stretching. During an aerobic workout, you can walk fast, swim, run, or use an exercise machine, for example. Other activities, like dancing or playing tennis, are also forms of aerobic exercise. You should also take time to stretch all of your joints, including your neck, shoulders, back, hips, and knees. At least 2 times a week, you can do resistance training exercises as part of your workout.  ?Cool down - Cooling down helps keep you from feeling dizzy after you exercise and helps prevent muscle cramps. To cool down, you can stretch or do a light aerobic exercise for 5 minutes.  Some people go to a gym or do group exercise classes. But you can exercise even without these things. Some exercises can be done even in a small space. You can also try online videos or smartphone apps to get ideas for different types of exercise.     How often should I exercise?   Doctors recommend that people exercise at least 30 minutes a day, on 5 or more days of the week.  If you can't exercise for 30 minutes straight, try to exercise for 10 minutes at a time, 3  or 4 times a day. Even exercising for shorter amounts of time is good for you, especially if it means spending less time sitting.   When should I call my doctor or nurse? -- If you have any of the following symptoms when you exercise, stop exercising and call your doctor or nurse right away:  ?Pain or pressure in your chest, arms, throat, jaw, or back  ?Nausea or vomiting  ?Feeling like your heart is fluttering or racing very fast  ?Feeling dizzy or faint    What if I don't have time to exercise?   Many people have very busy lives and might not think that they have time to exercise. But it's important to try to find time to exercise, even if you are tired or work a lot. Exercise can increase your energy level, which can make you feel better and might even help you get more work done.  Even if it's hard to set aside a lot of time to exercise, you can still improve your health by moving your body more. There are many ways that you can be more active. For example, you can:  ?Take the stairs instead of the elevator  ?Park in a parking space that is farther away from the door  ?Take a longer route when you walk from one place to another  Spending a lot of time sitting still - for example, watching television or working on the computer - can be bad for your health. Try to get up and move around whenever you can. Even small amounts of movement, like taking short walks, doing household chores, or gardening, can help improve your health. Finding activities you enjoy, or doing them with other people, can help you add more movement into your daily life.    What else should I do when I exercise?   To exercise safely and avoid problems, it's important to:  ?Drink fluids during and after exercising (but avoid drinks with a lot of caffeine or sugar)  ?Avoid exercising outside if it is too hot or cold  ?Wear layers of clothes, so that you can take them off if you get too hot  ?Wear shoes that fit well and support your feet  ?Be  aware of your surroundings if you exercise outside

## 2025-07-17 LAB
BACTERIA UR CULT: NO GROWTH
BACTERIA UR CULT: NORMAL

## 2025-07-22 NOTE — PROGRESS NOTES
"Subjective   Didi Rivas is a 44 y.o. female.   Chief Complaint   Patient presents with    Excessive Sweating       History of Present Illness     Diaphoresis   - Started: Few months ago, worse over the last few months  - Symptoms: sweating all the time/through clothes, doesn't feel hot, random episodes even when right in front of air  - States she's also getting more mood swings, losing hair and getting chin whiskers and mustache. Feels more unfocussed and wonders if she's going through menopause  - has nexplanon and hasn't had period for 10 years      Palpitations  - Follow-up from 7/15/2025: Psych increased metoprolol XL up to 50 mg due to sweating and palpitations as they thought this was due to anxiety.  Patient noted no improvement and felt more fatigued on the higher dose  - Reduce metoprolol succinate XL back to 25 mg daily  Today  - patient states they do not happen often, seems to be unchanged after decreasing down to 25mg. Doesn't feel less tired after decreasing dose  - psych managing ADHD. Pt states she ran out of ADHD meds about the time metoprolol was increase to 50mg      Left hand edema/chronic left hand pain/ Peeling skin  -Follow-up from 7/15/2025: Reported numbness and pain over thenar eminence, thumb, index, ring and middle fingers.  Only left hand is affected.  Has carpal tunnel and is seeing hand surgery for management.   - Had swelling over the back of her hand and when the swelling resolved she had flaking skin. Tried changing body wash, thick lotion, moisturizing lotion with slight improvement  - Placed referral to occupational therapy.  Ordered arterial duplex of left upper extremity  Today  - Pt states hands are still peeling, dry skin. Pain, numbness, \"tight\", joint popping, soreness  - woke up with severe pain in hand again, using wrist brace at night   - seeing Hand specialist prn (last seen a few months ago). Last appointment got injection in wrist and usually they last 6 mo to " "1 year  - States MCP of left hand popped loudly after last appointment and had some lingering pain  - sister and grandmother have rheumatoid, wants to be tested for autoimmune diseases       Left elbow pain/left shoulder pain/right low back pain/left wrist pain   - Follow-up from 7/15/2025: Noted right low back pain being constant and shooting across the hip.  Left elbow popping in the last few months, left shoulder pain started 1 to 2 months ago  - Placed referral to physical therapy and Occupational Therapy.  Placed order for arterial Doppler of left upper extremity  Today  - patient states she has improved with the elbow/ shoulder pain, weakness has improved as well.   - lower back pain is unchanged       Little Urination  - Follow-up from 7/15/2025: Started after 7/3 - , reported urinating twice daily, has been drinking Gatorade and liquid IV.  Unsure of pelvic pain as she has numbness over her pelvis after her   - UA had +1 blood and leukocytes.  Urine culture sent and nitrofurantoin course given  Today  - Back pain lessened when on medication. Still having little urine output, states urine is starting to get an odor  - Denies urinary frequency or urgency, unusual vaginal discharge or itching  - has had about 68 oz of fluid today, BP well supported  - states she has \"been thirsty for years\" just worse in the last 6 months  - states she has not been taking straterra        The following portions of the patient's history were reviewed and updated as appropriate: allergies, current medications, past family history, past medical history, past social history, past surgical history, and problem list.    Patient Active Problem List   Diagnosis    Anxiety    Hypertension, benign    Class 3 severe obesity due to excess calories without serious comorbidity with body mass index (BMI) of 40.0 to 44.9 in adult    Chronic fatigue    Chronic diarrhea    GERD (gastroesophageal reflux disease)    Palpitations    " "Prediabetes    Mixed hyperlipidemia    Environmental allergies    OTIS on CPAP       Current Outpatient Medications on File Prior to Visit   Medication Sig Dispense Refill    albuterol sulfate  (90 Base) MCG/ACT inhaler       amphetamine-dextroamphetamine XR (ADDERALL XR) 20 MG 24 hr capsule Take 1 capsule by mouth Every Morning      clindamycin (CLEOCIN T) 1 % external solution PART HAIR AND APPLY A FEW DROPS TO AFFECTED AREA TWICE DAILY UNTIL RESOLVED      clobetasol propionate (CLOBEX) 0.05 % shampoo Apply  topically to the appropriate area as directed.      esomeprazole (nexIUM) 20 MG capsule As Needed.      Etonogestrel (NEXPLANON) 68 MG implant subdermal implant Inject  under the skin into the appropriate area as directed. Left arm      fexofenadine (Allegra Allergy) 60 MG tablet       metoprolol succinate XL (TOPROL-XL) 25 MG 24 hr tablet Take 1 tablet by mouth Daily. 90 tablet 3    Nasacort Allergy 24HR 55 MCG/ACT nasal inhaler INSTILL 2 SPRAYS IN EACH NOSTRIL ONCE DAILY IN THE MORNING      venlafaxine XR (EFFEXOR-XR) 150 MG 24 hr capsule       atomoxetine (STRATTERA) 40 MG capsule  (Patient not taking: Reported on 7/24/2025)       No current facility-administered medications on file prior to visit.     Current outpatient and discharge medications have been reconciled for the patient.  Reviewed by: Linda Pritchett DO      Allergies   Allergen Reactions    Bee Pollen Rash    Cats Claw (Uncaria Tomentosa) Rash    Molds & Smuts Rash         Objective   Visit Vitals  /92   Pulse 88   Temp 97.7 °F (36.5 °C) (Temporal)   Resp 18   Ht 165.1 cm (65\")   Wt 110 kg (242 lb 9.6 oz)   SpO2 98%   BMI 40.37 kg/m²       Physical Exam  HENT:      Head: Normocephalic and atraumatic.   Eyes:      Conjunctiva/sclera: Conjunctivae normal.   Neurological:      General: No focal deficit present.      Mental Status: She is alert and oriented to person, place, and time.   Psychiatric:         Mood and Affect: Mood " normal.         Behavior: Behavior normal.           Diagnoses and all orders for this visit:    1. Diaphoresis (Primary)  -     HIV-1 / O / 2 Ag / Antibody  - with pt's other described symptoms, suspect this may be menopause.  -    started cloNIDine (CATAPRES) 0.1 MG tablet; Take 0.5 tablets by mouth 2 (Two) Times a Day.  Dispense: 30 tablet; Refill: 1    2. Palpitations  - unchanged after dose adjustment. Thinks her increased fatigue is from running out of Adderall    3. Swelling of left hand/ Chronic hand pain, left/ Pain of left thumb  -     XR Hand 3+ View Left  - still has OT order  - arterial doppler of left UE ordered    5. Itch of skin  - improved in appearance today.   - arterial doppler of left UE ordered    6. Left elbow pain/Acute pain of left shoulder  - Patient still has PT order    8. Decreased urination  -     POCT urinalysis dipstick, multipro: Trace Ketones and Protein  - Gave them a message to get patient.  Requested patient keep a voiding diary to track bowel movements and quality of stool, did mention that amphetamines can cause urinary retention (the patient recently ran out of ADHD medication)  - If no improvement on next appointment, would do a pelvic exam to look for cystocele    10. Multiple joint pain  -     DAVID  -     Anti-DNA Antibody, Double-stranded  -     C-reactive protein  -     Sedimentation Rate  -     Rheumatoid Factor    11. BMI 40.0-44.9, adult  Class 3 Severe Obesity (BMI >=40). Obesity-related health conditions include the following: hypertension. Obesity is improving with treatment. BMI is is above average; BMI management plan is completed. We discussed portion control and increasing exercise.       I spent 44 minutes caring for Didi on this date of service. This time includes time spent by me in the following activities: preparing for the visit, counseling and educating the patient/family/caregiver, documenting information in the medical record, ordering medications,  and ordering test(s)      Follow Up  - 9/5/2025 for multiple joint pains and sweating    Expected course, medications, and adverse effects discussed as appropriate.  Call or return if worsening or persistent symptoms. Hand hygiene was performed before donning protective equipment and after removal when leaving the room.    This document is intended for medical expert use only. Reading of this document by patients and/or patient's family without participating medical staff guidance may result in misinterpretation and unintended morbidity. Any interpretation of such data is the responsibility of the patient and/or family member responsible for the patient in concert with their primary or specialist providers, not to be left for sources of online searches such as [employer], [employer] or similar queries. Relying on these approaches to knowledge may result in misinterpretation, misguided goals of care and even death should patients or family members try recommendations outside of the realm of professional medical care.

## 2025-07-24 ENCOUNTER — OFFICE VISIT (OUTPATIENT)
Dept: FAMILY MEDICINE CLINIC | Facility: CLINIC | Age: 45
End: 2025-07-24
Payer: COMMERCIAL

## 2025-07-24 VITALS
WEIGHT: 242.6 LBS | RESPIRATION RATE: 18 BRPM | HEIGHT: 65 IN | OXYGEN SATURATION: 98 % | BODY MASS INDEX: 40.42 KG/M2 | HEART RATE: 88 BPM | SYSTOLIC BLOOD PRESSURE: 122 MMHG | DIASTOLIC BLOOD PRESSURE: 92 MMHG | TEMPERATURE: 97.7 F

## 2025-07-24 DIAGNOSIS — M79.645 PAIN OF LEFT THUMB: ICD-10-CM

## 2025-07-24 DIAGNOSIS — L29.9 ITCH OF SKIN: ICD-10-CM

## 2025-07-24 DIAGNOSIS — R00.2 PALPITATIONS: ICD-10-CM

## 2025-07-24 DIAGNOSIS — M79.89 SWELLING OF LEFT HAND: ICD-10-CM

## 2025-07-24 DIAGNOSIS — R34 DECREASED URINATION: ICD-10-CM

## 2025-07-24 DIAGNOSIS — G89.29 CHRONIC HAND PAIN, LEFT: ICD-10-CM

## 2025-07-24 DIAGNOSIS — R61 DIAPHORESIS: Primary | ICD-10-CM

## 2025-07-24 DIAGNOSIS — M79.642 CHRONIC HAND PAIN, LEFT: ICD-10-CM

## 2025-07-24 DIAGNOSIS — M25.50 MULTIPLE JOINT PAIN: ICD-10-CM

## 2025-07-24 DIAGNOSIS — M25.522 LEFT ELBOW PAIN: ICD-10-CM

## 2025-07-24 DIAGNOSIS — M25.512 ACUTE PAIN OF LEFT SHOULDER: ICD-10-CM

## 2025-07-24 LAB
BILIRUB BLD-MCNC: NEGATIVE MG/DL
CLARITY, POC: CLEAR
COLOR UR: YELLOW
GLUCOSE UR STRIP-MCNC: NEGATIVE MG/DL
KETONES UR QL: ABNORMAL
LEUKOCYTE EST, POC: NEGATIVE
NITRITE UR-MCNC: NEGATIVE MG/ML
PH UR: 5 [PH] (ref 5–8)
PROT UR STRIP-MCNC: ABNORMAL MG/DL
RBC # UR STRIP: NEGATIVE /UL
SP GR UR: 1.01 (ref 1–1.03)
UROBILINOGEN UR QL: NORMAL

## 2025-07-24 PROCEDURE — 81003 URINALYSIS AUTO W/O SCOPE: CPT | Performed by: STUDENT IN AN ORGANIZED HEALTH CARE EDUCATION/TRAINING PROGRAM

## 2025-07-24 PROCEDURE — 99215 OFFICE O/P EST HI 40 MIN: CPT | Performed by: STUDENT IN AN ORGANIZED HEALTH CARE EDUCATION/TRAINING PROGRAM

## 2025-07-24 RX ORDER — CLONIDINE HYDROCHLORIDE 0.1 MG/1
0.05 TABLET ORAL 2 TIMES DAILY
Qty: 30 TABLET | Refills: 1 | Status: SHIPPED | OUTPATIENT
Start: 2025-07-24

## 2025-07-25 LAB
ALBUMIN SERPL-MCNC: 4.4 G/DL (ref 3.9–4.9)
ALP SERPL-CCNC: 109 IU/L (ref 44–121)
ALT SERPL-CCNC: 21 IU/L (ref 0–32)
ANA SER QL: NEGATIVE
AST SERPL-CCNC: 22 IU/L (ref 0–40)
BASOPHILS # BLD AUTO: 0 X10E3/UL (ref 0–0.2)
BASOPHILS NFR BLD AUTO: 1 %
BILIRUB SERPL-MCNC: 0.3 MG/DL (ref 0–1.2)
BUN SERPL-MCNC: 11 MG/DL (ref 6–24)
BUN/CREAT SERPL: 15 (ref 9–23)
CALCIUM SERPL-MCNC: 9.3 MG/DL (ref 8.7–10.2)
CHLORIDE SERPL-SCNC: 102 MMOL/L (ref 96–106)
CHOLEST SERPL-MCNC: 223 MG/DL (ref 100–199)
CO2 SERPL-SCNC: 21 MMOL/L (ref 20–29)
CREAT SERPL-MCNC: 0.72 MG/DL (ref 0.57–1)
CRP SERPL-MCNC: 3 MG/L (ref 0–10)
DSDNA AB SER-ACNC: <1 IU/ML (ref 0–9)
EGFRCR SERPLBLD CKD-EPI 2021: 106 ML/MIN/1.73
EOSINOPHIL # BLD AUTO: 0.2 X10E3/UL (ref 0–0.4)
EOSINOPHIL NFR BLD AUTO: 2 %
ERYTHROCYTE [DISTWIDTH] IN BLOOD BY AUTOMATED COUNT: 12.8 % (ref 11.7–15.4)
ERYTHROCYTE [SEDIMENTATION RATE] IN BLOOD BY WESTERGREN METHOD: 14 MM/HR (ref 0–32)
GLOBULIN SER CALC-MCNC: 2.6 G/DL (ref 1.5–4.5)
GLUCOSE SERPL-MCNC: 140 MG/DL (ref 70–99)
HBA1C MFR BLD: 5.7 % (ref 4.8–5.6)
HCT VFR BLD AUTO: 42.1 % (ref 34–46.6)
HDLC SERPL-MCNC: 47 MG/DL
HGB BLD-MCNC: 12.8 G/DL (ref 11.1–15.9)
HIV 1+2 AB+HIV1 P24 AG SERPL QL IA: NON REACTIVE
IMM GRANULOCYTES # BLD AUTO: 0.1 X10E3/UL (ref 0–0.1)
IMM GRANULOCYTES NFR BLD AUTO: 1 %
LDLC SERPL CALC-MCNC: 138 MG/DL (ref 0–99)
LYMPHOCYTES # BLD AUTO: 2.8 X10E3/UL (ref 0.7–3.1)
LYMPHOCYTES NFR BLD AUTO: 32 %
MCH RBC QN AUTO: 26 PG (ref 26.6–33)
MCHC RBC AUTO-ENTMCNC: 30.4 G/DL (ref 31.5–35.7)
MCV RBC AUTO: 85 FL (ref 79–97)
MONOCYTES # BLD AUTO: 0.5 X10E3/UL (ref 0.1–0.9)
MONOCYTES NFR BLD AUTO: 6 %
NEUTROPHILS # BLD AUTO: 5.1 X10E3/UL (ref 1.4–7)
NEUTROPHILS NFR BLD AUTO: 58 %
PLATELET # BLD AUTO: 297 X10E3/UL (ref 150–450)
POTASSIUM SERPL-SCNC: 4 MMOL/L (ref 3.5–5.2)
PROT SERPL-MCNC: 7 G/DL (ref 6–8.5)
RBC # BLD AUTO: 4.93 X10E6/UL (ref 3.77–5.28)
RHEUMATOID FACT SERPL-ACNC: <10 IU/ML
SODIUM SERPL-SCNC: 140 MMOL/L (ref 134–144)
TRIGL SERPL-MCNC: 212 MG/DL (ref 0–149)
VLDLC SERPL CALC-MCNC: 38 MG/DL (ref 5–40)
WBC # BLD AUTO: 8.7 X10E3/UL (ref 3.4–10.8)

## 2025-07-26 ENCOUNTER — TELEPHONE (OUTPATIENT)
Dept: FAMILY MEDICINE CLINIC | Facility: CLINIC | Age: 45
End: 2025-07-26
Payer: COMMERCIAL

## 2025-07-26 NOTE — TELEPHONE ENCOUNTER
In regards to patient's small urine output since 7/3-7/4:    Her recent CMP shows her kidney function is normal. UA on last appointment came back without signs of UTI.    I would have her keep a voiding diary ([email or web address]). Could we leave a urine hat at the  for her to use to measure her urine output?     I would also like her to track her bowel movements (and quality of the stool). She can't feel much in her pelvis due to a surgery so we should do a pelvic exam next appointment to look for uterine prolapse.    Amphetamines can cause urinary retention. I know she had recently runout of her ADHD meds but it's possible it could be contributing.

## 2025-08-12 ENCOUNTER — TRANSCRIBE ORDERS (OUTPATIENT)
Dept: ADMINISTRATIVE | Facility: HOSPITAL | Age: 45
End: 2025-08-12
Payer: COMMERCIAL

## 2025-08-12 DIAGNOSIS — Z12.31 ENCOUNTER FOR SCREENING MAMMOGRAM FOR MALIGNANT NEOPLASM OF BREAST: Primary | ICD-10-CM
